# Patient Record
Sex: FEMALE | NOT HISPANIC OR LATINO | Employment: FULL TIME | URBAN - METROPOLITAN AREA
[De-identification: names, ages, dates, MRNs, and addresses within clinical notes are randomized per-mention and may not be internally consistent; named-entity substitution may affect disease eponyms.]

---

## 2024-03-14 ENCOUNTER — NUTRITION (OUTPATIENT)
Dept: OBSTETRICS AND GYNECOLOGY | Facility: CLINIC | Age: 29
End: 2024-03-14

## 2024-03-14 LAB
EXTERNAL CHLAMYDIA SCREEN: NEGATIVE
EXTERNAL CHLAMYDIA SCREEN: NEGATIVE
EXTERNAL GONORRHEA SCREEN: NEGATIVE
EXTERNAL GONORRHEA SCREEN: NEGATIVE
GLUCOSE, 1 HR SCREEN, PREG EXTERNAL: 64 MG/DL
GLUCOSE, 1 HR SCREEN, PREG EXTERNAL: 64 MG/DL
SYPHILIS TOTAL AB EXTERNAL: NON REACTIVE
SYPHILIS TOTAL AB EXTERNAL: NON REACTIVE

## 2024-07-02 DIAGNOSIS — O16.2 HYPERTENSION AFFECTING PREGNANCY IN SECOND TRIMESTER (HHS-HCC): ICD-10-CM

## 2024-07-02 RX ORDER — ACETAMINOPHEN 500 MG
TABLET ORAL
Qty: 1 EACH | Refills: 0 | Status: SHIPPED | OUTPATIENT
Start: 2024-07-02

## 2024-07-03 ENCOUNTER — ANESTHESIA (OUTPATIENT)
Dept: OBSTETRICS AND GYNECOLOGY | Facility: HOSPITAL | Age: 29
End: 2024-07-03
Payer: COMMERCIAL

## 2024-07-03 ENCOUNTER — ANESTHESIA EVENT (OUTPATIENT)
Dept: OBSTETRICS AND GYNECOLOGY | Facility: HOSPITAL | Age: 29
End: 2024-07-03
Payer: COMMERCIAL

## 2024-07-03 ENCOUNTER — HOSPITAL ENCOUNTER (INPATIENT)
Facility: HOSPITAL | Age: 29
End: 2024-07-03
Attending: OBSTETRICS & GYNECOLOGY | Admitting: OBSTETRICS & GYNECOLOGY
Payer: COMMERCIAL

## 2024-07-03 DIAGNOSIS — O14.93: Primary | ICD-10-CM

## 2024-07-03 DIAGNOSIS — O14.93 PREECLAMPSIA, THIRD TRIMESTER (HHS-HCC): ICD-10-CM

## 2024-07-03 PROBLEM — E10.9 DIABETES MELLITUS TYPE 1 (MULTI): Status: ACTIVE | Noted: 2024-07-03

## 2024-07-03 PROBLEM — K22.2 ESOPHAGEAL STRICTURE: Status: ACTIVE | Noted: 2024-07-03

## 2024-07-03 PROBLEM — G70.9 NEUROMUSCULAR DISEASE (MULTI): Status: ACTIVE | Noted: 2024-07-03

## 2024-07-03 PROBLEM — E78.5 HYPERLIPIDEMIA: Status: ACTIVE | Noted: 2024-07-03

## 2024-07-03 PROBLEM — E03.9 HYPOTHYROIDISM: Status: ACTIVE | Noted: 2024-07-03

## 2024-07-03 LAB
ABO GROUP (TYPE) IN BLOOD: NORMAL
ABO GROUP (TYPE) IN BLOOD: NORMAL
ALBUMIN SERPL BCP-MCNC: 2.7 G/DL (ref 3.4–5)
ALBUMIN SERPL BCP-MCNC: 2.7 G/DL (ref 3.4–5)
ALP SERPL-CCNC: 92 U/L (ref 33–110)
ALP SERPL-CCNC: 92 U/L (ref 33–110)
ALT SERPL W P-5'-P-CCNC: 8 U/L (ref 7–45)
ALT SERPL W P-5'-P-CCNC: 8 U/L (ref 7–45)
ANION GAP SERPL CALC-SCNC: 12 MMOL/L (ref 10–20)
ANION GAP SERPL CALC-SCNC: 12 MMOL/L (ref 10–20)
ANTIBODY SCREEN: NORMAL
ANTIBODY SCREEN: NORMAL
AST SERPL W P-5'-P-CCNC: 13 U/L (ref 9–39)
AST SERPL W P-5'-P-CCNC: 13 U/L (ref 9–39)
BILIRUB SERPL-MCNC: 0.3 MG/DL (ref 0–1.2)
BILIRUB SERPL-MCNC: 0.3 MG/DL (ref 0–1.2)
BUN SERPL-MCNC: 17 MG/DL (ref 6–23)
BUN SERPL-MCNC: 17 MG/DL (ref 6–23)
CALCIUM SERPL-MCNC: 8.1 MG/DL (ref 8.6–10.6)
CALCIUM SERPL-MCNC: 8.1 MG/DL (ref 8.6–10.6)
CHLORIDE SERPL-SCNC: 108 MMOL/L (ref 98–107)
CHLORIDE SERPL-SCNC: 108 MMOL/L (ref 98–107)
CO2 SERPL-SCNC: 21 MMOL/L (ref 21–32)
CO2 SERPL-SCNC: 21 MMOL/L (ref 21–32)
CREAT SERPL-MCNC: 0.54 MG/DL (ref 0.5–1.05)
CREAT SERPL-MCNC: 0.54 MG/DL (ref 0.5–1.05)
CREAT UR-MCNC: 440.5 MG/DL (ref 20–320)
CREAT UR-MCNC: 440.5 MG/DL (ref 20–320)
EGFRCR SERPLBLD CKD-EPI 2021: >90 ML/MIN/1.73M*2
EGFRCR SERPLBLD CKD-EPI 2021: >90 ML/MIN/1.73M*2
ERYTHROCYTE [DISTWIDTH] IN BLOOD BY AUTOMATED COUNT: 13.5 % (ref 11.5–14.5)
ERYTHROCYTE [DISTWIDTH] IN BLOOD BY AUTOMATED COUNT: 13.5 % (ref 11.5–14.5)
GLUCOSE BLD MANUAL STRIP-MCNC: 63 MG/DL (ref 74–99)
GLUCOSE BLD MANUAL STRIP-MCNC: 63 MG/DL (ref 74–99)
GLUCOSE BLD MANUAL STRIP-MCNC: 91 MG/DL (ref 74–99)
GLUCOSE BLD MANUAL STRIP-MCNC: 91 MG/DL (ref 74–99)
GLUCOSE BLD MANUAL STRIP-MCNC: 97 MG/DL (ref 74–99)
GLUCOSE BLD MANUAL STRIP-MCNC: 97 MG/DL (ref 74–99)
GLUCOSE SERPL-MCNC: 85 MG/DL (ref 74–99)
GLUCOSE SERPL-MCNC: 85 MG/DL (ref 74–99)
HCT VFR BLD AUTO: 35.2 % (ref 36–46)
HCT VFR BLD AUTO: 35.2 % (ref 36–46)
HGB BLD-MCNC: 11.9 G/DL (ref 12–16)
HGB BLD-MCNC: 11.9 G/DL (ref 12–16)
MCH RBC QN AUTO: 29.5 PG (ref 26–34)
MCH RBC QN AUTO: 29.5 PG (ref 26–34)
MCHC RBC AUTO-ENTMCNC: 33.8 G/DL (ref 32–36)
MCHC RBC AUTO-ENTMCNC: 33.8 G/DL (ref 32–36)
MCV RBC AUTO: 87 FL (ref 80–100)
MCV RBC AUTO: 87 FL (ref 80–100)
NRBC BLD-RTO: 0 /100 WBCS (ref 0–0)
NRBC BLD-RTO: 0 /100 WBCS (ref 0–0)
PLATELET # BLD AUTO: 163 X10*3/UL (ref 150–450)
PLATELET # BLD AUTO: 163 X10*3/UL (ref 150–450)
POTASSIUM SERPL-SCNC: 4 MMOL/L (ref 3.5–5.3)
POTASSIUM SERPL-SCNC: 4 MMOL/L (ref 3.5–5.3)
PROT SERPL-MCNC: 4.9 G/DL (ref 6.4–8.2)
PROT SERPL-MCNC: 4.9 G/DL (ref 6.4–8.2)
PROT UR-ACNC: 609 MG/DL (ref 5–24)
PROT UR-ACNC: 609 MG/DL (ref 5–24)
PROT/CREAT UR: 1.38 MG/MG CREAT (ref 0–0.17)
PROT/CREAT UR: 1.38 MG/MG CREAT (ref 0–0.17)
RBC # BLD AUTO: 4.03 X10*6/UL (ref 4–5.2)
RBC # BLD AUTO: 4.03 X10*6/UL (ref 4–5.2)
RH FACTOR (ANTIGEN D): NORMAL
RH FACTOR (ANTIGEN D): NORMAL
SODIUM SERPL-SCNC: 137 MMOL/L (ref 136–145)
SODIUM SERPL-SCNC: 137 MMOL/L (ref 136–145)
TREPONEMA PALLIDUM IGG+IGM AB [PRESENCE] IN SERUM OR PLASMA BY IMMUNOASSAY: NONREACTIVE
TREPONEMA PALLIDUM IGG+IGM AB [PRESENCE] IN SERUM OR PLASMA BY IMMUNOASSAY: NONREACTIVE
WBC # BLD AUTO: 6.1 X10*3/UL (ref 4.4–11.3)
WBC # BLD AUTO: 6.1 X10*3/UL (ref 4.4–11.3)

## 2024-07-03 PROCEDURE — 99199 UNLISTED SPECIAL SVC PX/RPRT: CPT

## 2024-07-03 PROCEDURE — 2500000002 HC RX 250 W HCPCS SELF ADMINISTERED DRUGS (ALT 637 FOR MEDICARE OP, ALT 636 FOR OP/ED)

## 2024-07-03 PROCEDURE — 1100000001 HC PRIVATE ROOM DAILY

## 2024-07-03 PROCEDURE — 36415 COLL VENOUS BLD VENIPUNCTURE: CPT

## 2024-07-03 PROCEDURE — 80053 COMPREHEN METABOLIC PANEL: CPT

## 2024-07-03 PROCEDURE — 2500000001 HC RX 250 WO HCPCS SELF ADMINISTERED DRUGS (ALT 637 FOR MEDICARE OP)

## 2024-07-03 PROCEDURE — 99223 1ST HOSP IP/OBS HIGH 75: CPT

## 2024-07-03 PROCEDURE — 2500000004 HC RX 250 GENERAL PHARMACY W/ HCPCS (ALT 636 FOR OP/ED)

## 2024-07-03 PROCEDURE — 3E0P7VZ INTRODUCTION OF HORMONE INTO FEMALE REPRODUCTIVE, VIA NATURAL OR ARTIFICIAL OPENING: ICD-10-PCS

## 2024-07-03 PROCEDURE — 82570 ASSAY OF URINE CREATININE: CPT

## 2024-07-03 PROCEDURE — 82947 ASSAY GLUCOSE BLOOD QUANT: CPT

## 2024-07-03 PROCEDURE — 99213 OFFICE O/P EST LOW 20 MIN: CPT

## 2024-07-03 PROCEDURE — 7210000002 HC LABOR PER HOUR

## 2024-07-03 PROCEDURE — 99221 1ST HOSP IP/OBS SF/LOW 40: CPT

## 2024-07-03 PROCEDURE — 85027 COMPLETE CBC AUTOMATED: CPT

## 2024-07-03 PROCEDURE — 86780 TREPONEMA PALLIDUM: CPT

## 2024-07-03 PROCEDURE — 86901 BLOOD TYPING SEROLOGIC RH(D): CPT

## 2024-07-03 RX ORDER — ONDANSETRON 4 MG/1
4 TABLET, FILM COATED ORAL EVERY 6 HOURS PRN
Status: DISCONTINUED | OUTPATIENT
Start: 2024-07-03 | End: 2024-07-03

## 2024-07-03 RX ORDER — DULOXETIN HYDROCHLORIDE 60 MG/1
60 CAPSULE, DELAYED RELEASE ORAL DAILY
Status: DISCONTINUED | OUTPATIENT
Start: 2024-07-04 | End: 2024-07-09 | Stop reason: HOSPADM

## 2024-07-03 RX ORDER — CARBOPROST TROMETHAMINE 250 UG/ML
250 INJECTION, SOLUTION INTRAMUSCULAR ONCE AS NEEDED
Status: DISCONTINUED | OUTPATIENT
Start: 2024-07-03 | End: 2024-07-06 | Stop reason: HOSPADM

## 2024-07-03 RX ORDER — LABETALOL HYDROCHLORIDE 5 MG/ML
20 INJECTION, SOLUTION INTRAVENOUS ONCE AS NEEDED
Status: COMPLETED | OUTPATIENT
Start: 2024-07-03 | End: 2024-07-03

## 2024-07-03 RX ORDER — NIFEDIPINE 30 MG/1
30 TABLET, FILM COATED, EXTENDED RELEASE ORAL ONCE
Status: COMPLETED | OUTPATIENT
Start: 2024-07-03 | End: 2024-07-03

## 2024-07-03 RX ORDER — METOCLOPRAMIDE HYDROCHLORIDE 5 MG/ML
10 INJECTION INTRAMUSCULAR; INTRAVENOUS ONCE
Status: DISCONTINUED | OUTPATIENT
Start: 2024-07-03 | End: 2024-07-04

## 2024-07-03 RX ORDER — DULOXETIN HYDROCHLORIDE 60 MG/1
60 CAPSULE, DELAYED RELEASE ORAL DAILY
COMMUNITY
Start: 2024-05-15

## 2024-07-03 RX ORDER — LEVOTHYROXINE SODIUM 50 UG/1
50 TABLET ORAL DAILY
Status: DISCONTINUED | OUTPATIENT
Start: 2024-07-04 | End: 2024-07-09 | Stop reason: HOSPADM

## 2024-07-03 RX ORDER — INSULIN LISPRO-AABC 100 [IU]/ML
INJECTION, SOLUTION INTRAVENOUS; SUBCUTANEOUS
COMMUNITY
Start: 2024-02-13

## 2024-07-03 RX ORDER — BISACODYL 10 MG/1
10 SUPPOSITORY RECTAL DAILY PRN
Status: DISCONTINUED | OUTPATIENT
Start: 2024-07-03 | End: 2024-07-06

## 2024-07-03 RX ORDER — NIFEDIPINE 60 MG/1
60 TABLET, FILM COATED, EXTENDED RELEASE ORAL
Status: DISCONTINUED | OUTPATIENT
Start: 2024-07-04 | End: 2024-07-04

## 2024-07-03 RX ORDER — HYDRALAZINE HYDROCHLORIDE 20 MG/ML
5 INJECTION INTRAMUSCULAR; INTRAVENOUS ONCE AS NEEDED
Status: COMPLETED | OUTPATIENT
Start: 2024-07-03 | End: 2024-07-04

## 2024-07-03 RX ORDER — ASPIRIN 81 MG/1
81 TABLET ORAL ONCE
COMMUNITY
Start: 2024-04-20 | End: 2024-07-09 | Stop reason: HOSPADM

## 2024-07-03 RX ORDER — PREGABALIN 75 MG/1
300 CAPSULE ORAL 2 TIMES DAILY
Status: DISCONTINUED | OUTPATIENT
Start: 2024-07-03 | End: 2024-07-09 | Stop reason: HOSPADM

## 2024-07-03 RX ORDER — DEXTROSE 40 %
30 GEL (GRAM) ORAL
Status: DISCONTINUED | OUTPATIENT
Start: 2024-07-03 | End: 2024-07-09 | Stop reason: HOSPADM

## 2024-07-03 RX ORDER — BLOOD-GLUCOSE TRANSMITTER
EACH MISCELLANEOUS
COMMUNITY
Start: 2024-04-19

## 2024-07-03 RX ORDER — HYDRALAZINE HYDROCHLORIDE 20 MG/ML
5 INJECTION INTRAMUSCULAR; INTRAVENOUS ONCE AS NEEDED
Status: DISCONTINUED | OUTPATIENT
Start: 2024-07-03 | End: 2024-07-03

## 2024-07-03 RX ORDER — ACETAMINOPHEN 325 MG/1
975 TABLET ORAL ONCE
Status: COMPLETED | OUTPATIENT
Start: 2024-07-03 | End: 2024-07-03

## 2024-07-03 RX ORDER — SODIUM CHLORIDE, SODIUM LACTATE, POTASSIUM CHLORIDE, CALCIUM CHLORIDE 600; 310; 30; 20 MG/100ML; MG/100ML; MG/100ML; MG/100ML
125 INJECTION, SOLUTION INTRAVENOUS CONTINUOUS
Status: DISCONTINUED | OUTPATIENT
Start: 2024-07-03 | End: 2024-07-03 | Stop reason: SDUPTHER

## 2024-07-03 RX ORDER — METHYLERGONOVINE MALEATE 0.2 MG/ML
0.2 INJECTION INTRAVENOUS ONCE AS NEEDED
Status: DISCONTINUED | OUTPATIENT
Start: 2024-07-03 | End: 2024-07-06 | Stop reason: HOSPADM

## 2024-07-03 RX ORDER — ONDANSETRON HYDROCHLORIDE 2 MG/ML
4 INJECTION, SOLUTION INTRAVENOUS EVERY 6 HOURS PRN
Status: DISCONTINUED | OUTPATIENT
Start: 2024-07-03 | End: 2024-07-06

## 2024-07-03 RX ORDER — LABETALOL HYDROCHLORIDE 5 MG/ML
20 INJECTION, SOLUTION INTRAVENOUS ONCE AS NEEDED
Status: DISCONTINUED | OUTPATIENT
Start: 2024-07-03 | End: 2024-07-03

## 2024-07-03 RX ORDER — NIFEDIPINE 10 MG/1
10 CAPSULE ORAL ONCE AS NEEDED
Status: DISCONTINUED | OUTPATIENT
Start: 2024-07-03 | End: 2024-07-06 | Stop reason: HOSPADM

## 2024-07-03 RX ORDER — DEXTROSE 50 % IN WATER (D50W) INTRAVENOUS SYRINGE
25
Status: DISCONTINUED | OUTPATIENT
Start: 2024-07-03 | End: 2024-07-09 | Stop reason: HOSPADM

## 2024-07-03 RX ORDER — SIMETHICONE 80 MG
80 TABLET,CHEWABLE ORAL 4 TIMES DAILY PRN
Status: DISCONTINUED | OUTPATIENT
Start: 2024-07-03 | End: 2024-07-06

## 2024-07-03 RX ORDER — MAGNESIUM SULFATE HEPTAHYDRATE 40 MG/ML
2 INJECTION, SOLUTION INTRAVENOUS CONTINUOUS
Status: DISCONTINUED | OUTPATIENT
Start: 2024-07-03 | End: 2024-07-09 | Stop reason: HOSPADM

## 2024-07-03 RX ORDER — METOPROLOL TARTRATE 25 MG/1
25 TABLET, FILM COATED ORAL 2 TIMES DAILY
Status: DISCONTINUED | OUTPATIENT
Start: 2024-07-03 | End: 2024-07-09 | Stop reason: HOSPADM

## 2024-07-03 RX ORDER — LIDOCAINE HYDROCHLORIDE 10 MG/ML
30 INJECTION INFILTRATION; PERINEURAL ONCE AS NEEDED
Status: DISCONTINUED | OUTPATIENT
Start: 2024-07-03 | End: 2024-07-06 | Stop reason: HOSPADM

## 2024-07-03 RX ORDER — SODIUM CHLORIDE, SODIUM LACTATE, POTASSIUM CHLORIDE, CALCIUM CHLORIDE 600; 310; 30; 20 MG/100ML; MG/100ML; MG/100ML; MG/100ML
75 INJECTION, SOLUTION INTRAVENOUS CONTINUOUS
Status: DISCONTINUED | OUTPATIENT
Start: 2024-07-03 | End: 2024-07-09 | Stop reason: HOSPADM

## 2024-07-03 RX ORDER — PREGABALIN 300 MG/1
300 CAPSULE ORAL 2 TIMES DAILY
COMMUNITY
Start: 2024-06-14

## 2024-07-03 RX ORDER — LABETALOL HYDROCHLORIDE 5 MG/ML
20 INJECTION, SOLUTION INTRAVENOUS ONCE AS NEEDED
Status: COMPLETED | OUTPATIENT
Start: 2024-07-03 | End: 2024-07-05

## 2024-07-03 RX ORDER — METOCLOPRAMIDE 10 MG/1
10 TABLET ORAL EVERY 6 HOURS PRN
Status: DISCONTINUED | OUTPATIENT
Start: 2024-07-03 | End: 2024-07-06

## 2024-07-03 RX ORDER — NIFEDIPINE 10 MG/1
10 CAPSULE ORAL ONCE AS NEEDED
Status: DISCONTINUED | OUTPATIENT
Start: 2024-07-03 | End: 2024-07-03

## 2024-07-03 RX ORDER — INSULIN PMP CART,AUT,G6/7,CNTR
EACH SUBCUTANEOUS
COMMUNITY
Start: 2023-07-10

## 2024-07-03 RX ORDER — TERBUTALINE SULFATE 1 MG/ML
0.25 INJECTION SUBCUTANEOUS ONCE AS NEEDED
Status: DISCONTINUED | OUTPATIENT
Start: 2024-07-03 | End: 2024-07-06 | Stop reason: HOSPADM

## 2024-07-03 RX ORDER — CALCIUM GLUCONATE 98 MG/ML
1 INJECTION, SOLUTION INTRAVENOUS ONCE AS NEEDED
Status: DISCONTINUED | OUTPATIENT
Start: 2024-07-03 | End: 2024-07-09 | Stop reason: HOSPADM

## 2024-07-03 RX ORDER — ONDANSETRON 4 MG/1
4 TABLET, FILM COATED ORAL EVERY 6 HOURS PRN
Status: DISCONTINUED | OUTPATIENT
Start: 2024-07-03 | End: 2024-07-04

## 2024-07-03 RX ORDER — DIPHENHYDRAMINE HYDROCHLORIDE 50 MG/ML
25 INJECTION INTRAMUSCULAR; INTRAVENOUS ONCE
Status: COMPLETED | OUTPATIENT
Start: 2024-07-03 | End: 2024-07-03

## 2024-07-03 RX ORDER — METOCLOPRAMIDE HYDROCHLORIDE 5 MG/ML
10 INJECTION INTRAMUSCULAR; INTRAVENOUS EVERY 6 HOURS PRN
Status: DISCONTINUED | OUTPATIENT
Start: 2024-07-03 | End: 2024-07-06

## 2024-07-03 RX ORDER — LEVOTHYROXINE SODIUM 50 UG/1
50 TABLET ORAL DAILY
COMMUNITY
Start: 2024-04-19

## 2024-07-03 RX ORDER — LABETALOL HYDROCHLORIDE 5 MG/ML
INJECTION, SOLUTION INTRAVENOUS
Status: DISPENSED
Start: 2024-07-03 | End: 2024-07-04

## 2024-07-03 RX ORDER — OXYTOCIN 10 [USP'U]/ML
10 INJECTION, SOLUTION INTRAMUSCULAR; INTRAVENOUS ONCE AS NEEDED
Status: DISCONTINUED | OUTPATIENT
Start: 2024-07-03 | End: 2024-07-06 | Stop reason: HOSPADM

## 2024-07-03 RX ORDER — OXYTOCIN/0.9 % SODIUM CHLORIDE 30/500 ML
60 PLASTIC BAG, INJECTION (ML) INTRAVENOUS ONCE AS NEEDED
Status: COMPLETED | OUTPATIENT
Start: 2024-07-03 | End: 2024-07-05

## 2024-07-03 RX ORDER — TRANEXAMIC ACID 100 MG/ML
1000 INJECTION, SOLUTION INTRAVENOUS ONCE AS NEEDED
Status: DISCONTINUED | OUTPATIENT
Start: 2024-07-03 | End: 2024-07-06 | Stop reason: HOSPADM

## 2024-07-03 RX ORDER — METOPROLOL TARTRATE 25 MG/1
25 TABLET, FILM COATED ORAL 2 TIMES DAILY
COMMUNITY
Start: 2024-04-23

## 2024-07-03 RX ORDER — MISOPROSTOL 200 UG/1
800 TABLET ORAL ONCE AS NEEDED
Status: DISCONTINUED | OUTPATIENT
Start: 2024-07-03 | End: 2024-07-06 | Stop reason: HOSPADM

## 2024-07-03 RX ORDER — ONDANSETRON HYDROCHLORIDE 2 MG/ML
4 INJECTION, SOLUTION INTRAVENOUS EVERY 6 HOURS PRN
Status: DISCONTINUED | OUTPATIENT
Start: 2024-07-03 | End: 2024-07-03

## 2024-07-03 RX ORDER — DEXTROSE MONOHYDRATE 100 MG/ML
100 INJECTION, SOLUTION INTRAVENOUS AS NEEDED
Status: DISCONTINUED | OUTPATIENT
Start: 2024-07-03 | End: 2024-07-05

## 2024-07-03 RX ORDER — LOPERAMIDE HYDROCHLORIDE 2 MG/1
4 CAPSULE ORAL EVERY 2 HOUR PRN
Status: DISCONTINUED | OUTPATIENT
Start: 2024-07-03 | End: 2024-07-06 | Stop reason: HOSPADM

## 2024-07-03 RX ORDER — NIFEDIPINE 10 MG/1
10 CAPSULE ORAL ONCE AS NEEDED
Status: DISCONTINUED | OUTPATIENT
Start: 2024-07-03 | End: 2024-07-03 | Stop reason: SDUPTHER

## 2024-07-03 RX ORDER — SODIUM CHLORIDE 9 MG/ML
25 INJECTION, SOLUTION INTRAVENOUS CONTINUOUS PRN
Status: DISCONTINUED | OUTPATIENT
Start: 2024-07-03 | End: 2024-07-05

## 2024-07-03 RX ORDER — ADHESIVE BANDAGE
10 BANDAGE TOPICAL
Status: DISCONTINUED | OUTPATIENT
Start: 2024-07-03 | End: 2024-07-06

## 2024-07-03 RX ORDER — DEXTROSE 40 %
15 GEL (GRAM) ORAL
Status: DISCONTINUED | OUTPATIENT
Start: 2024-07-03 | End: 2024-07-09 | Stop reason: HOSPADM

## 2024-07-03 RX ORDER — LIDOCAINE HYDROCHLORIDE 10 MG/ML
0.5 INJECTION INFILTRATION; PERINEURAL ONCE AS NEEDED
Status: DISCONTINUED | OUTPATIENT
Start: 2024-07-03 | End: 2024-07-06

## 2024-07-03 RX ORDER — LIDOCAINE HYDROCHLORIDE 10 MG/ML
0.5 INJECTION INFILTRATION; PERINEURAL ONCE AS NEEDED
Status: DISCONTINUED | OUTPATIENT
Start: 2024-07-03 | End: 2024-07-03

## 2024-07-03 RX ORDER — DEXTROSE MONOHYDRATE 100 MG/ML
50 INJECTION, SOLUTION INTRAVENOUS CONTINUOUS PRN
Status: DISCONTINUED | OUTPATIENT
Start: 2024-07-03 | End: 2024-07-05

## 2024-07-03 RX ORDER — POLYETHYLENE GLYCOL 3350 17 G/17G
17 POWDER, FOR SOLUTION ORAL 2 TIMES DAILY PRN
Status: DISCONTINUED | OUTPATIENT
Start: 2024-07-03 | End: 2024-07-06

## 2024-07-03 SDOH — SOCIAL STABILITY: SOCIAL INSECURITY: VERBAL ABUSE: DENIES

## 2024-07-03 SDOH — SOCIAL STABILITY: SOCIAL INSECURITY: HAS ANYONE EVER THREATENED TO HURT YOUR FAMILY OR YOUR PETS?: NO

## 2024-07-03 SDOH — HEALTH STABILITY: MENTAL HEALTH: SUICIDAL BEHAVIOR (LIFETIME): NO

## 2024-07-03 SDOH — SOCIAL STABILITY: SOCIAL INSECURITY: ABUSE SCREEN: ADULT

## 2024-07-03 SDOH — SOCIAL STABILITY: SOCIAL INSECURITY: DOES ANYONE TRY TO KEEP YOU FROM HAVING/CONTACTING OTHER FRIENDS OR DOING THINGS OUTSIDE YOUR HOME?: NO

## 2024-07-03 SDOH — ECONOMIC STABILITY: HOUSING INSECURITY: DO YOU FEEL UNSAFE GOING BACK TO THE PLACE WHERE YOU ARE LIVING?: NO

## 2024-07-03 SDOH — HEALTH STABILITY: MENTAL HEALTH: WERE YOU ABLE TO COMPLETE ALL THE BEHAVIORAL HEALTH SCREENINGS?: YES

## 2024-07-03 SDOH — HEALTH STABILITY: MENTAL HEALTH: HAVE YOU USED ANY PRESCRIPTION DRUGS OTHER THAN PRESCRIBED IN THE PAST 12 MONTHS?: NO

## 2024-07-03 SDOH — SOCIAL STABILITY: SOCIAL INSECURITY: HAVE YOU HAD THOUGHTS OF HARMING ANYONE ELSE?: NO

## 2024-07-03 SDOH — HEALTH STABILITY: MENTAL HEALTH: STRENGTHS (MUST CHOOSE TWO): SUPPORT FROM FRIENDS;STABLE HOUSING;SUPPORT FROM FAMILY

## 2024-07-03 SDOH — HEALTH STABILITY: MENTAL HEALTH: HAVE YOU USED ANY SUBSTANCES (CANABIS, COCAINE, HEROIN, HALLUCINOGENS, INHALANTS, ETC.) IN THE PAST 12 MONTHS?: NO

## 2024-07-03 SDOH — SOCIAL STABILITY: SOCIAL INSECURITY: ARE THERE ANY APPARENT SIGNS OF INJURIES/BEHAVIORS THAT COULD BE RELATED TO ABUSE/NEGLECT?: NO

## 2024-07-03 SDOH — SOCIAL STABILITY: SOCIAL INSECURITY: HAVE YOU HAD ANY THOUGHTS OF HARMING ANYONE ELSE?: NO

## 2024-07-03 SDOH — HEALTH STABILITY: MENTAL HEALTH: NON-SPECIFIC ACTIVE SUICIDAL THOUGHTS (PAST 1 MONTH): NO

## 2024-07-03 SDOH — SOCIAL STABILITY: SOCIAL INSECURITY: DO YOU FEEL ANYONE HAS EXPLOITED OR TAKEN ADVANTAGE OF YOU FINANCIALLY OR OF YOUR PERSONAL PROPERTY?: NO

## 2024-07-03 SDOH — SOCIAL STABILITY: SOCIAL INSECURITY: ARE YOU OR HAVE YOU BEEN THREATENED OR ABUSED PHYSICALLY, EMOTIONALLY, OR SEXUALLY BY ANYONE?: NO

## 2024-07-03 SDOH — HEALTH STABILITY: MENTAL HEALTH: CURRENT SMOKER: 0

## 2024-07-03 SDOH — SOCIAL STABILITY: SOCIAL INSECURITY: PHYSICAL ABUSE: DENIES

## 2024-07-03 SDOH — HEALTH STABILITY: MENTAL HEALTH: WISH TO BE DEAD (PAST 1 MONTH): NO

## 2024-07-03 ASSESSMENT — PAIN SCALES - GENERAL
PAINLEVEL_OUTOF10: 3
PAINLEVEL_OUTOF10: 0 - NO PAIN
PAINLEVEL_OUTOF10: 2
PAINLEVEL_OUTOF10: 0 - NO PAIN
PAINLEVEL_OUTOF10: 1
PAINLEVEL_OUTOF10: 2
PAINLEVEL_OUTOF10: 0 - NO PAIN

## 2024-07-03 ASSESSMENT — PAIN DESCRIPTION - DESCRIPTORS: DESCRIPTORS: ACHING

## 2024-07-03 ASSESSMENT — PAIN DESCRIPTION - LOCATION
LOCATION: HEAD
LOCATION: HEAD

## 2024-07-03 ASSESSMENT — LIFESTYLE VARIABLES
HOW MANY STANDARD DRINKS CONTAINING ALCOHOL DO YOU HAVE ON A TYPICAL DAY: PATIENT DOES NOT DRINK
HOW OFTEN DO YOU HAVE 6 OR MORE DRINKS ON ONE OCCASION: NEVER
AUDIT-C TOTAL SCORE: 0
SKIP TO QUESTIONS 9-10: 1
HOW OFTEN DO YOU HAVE A DRINK CONTAINING ALCOHOL: NEVER
AUDIT-C TOTAL SCORE: 0

## 2024-07-03 ASSESSMENT — PATIENT HEALTH QUESTIONNAIRE - PHQ9
1. LITTLE INTEREST OR PLEASURE IN DOING THINGS: NOT AT ALL
SUM OF ALL RESPONSES TO PHQ9 QUESTIONS 1 & 2: 0
2. FEELING DOWN, DEPRESSED OR HOPELESS: NOT AT ALL

## 2024-07-03 NOTE — ANESTHESIA PREPROCEDURE EVALUATION
Patient: Estefani Alvarenga    Evaluation Method: In-person visit    Procedure Information    Date: 07/03/24  Procedure: Labor Consult         Relevant Problems   Anesthesia (within normal limits)      Cardiac   (+) Hyperlipidemia (Stopped statin once pregnant)   (+) Preeclampsia, third trimester ([facility]HS-HCC)      Pulmonary (within normal limits)      Neuro   (+) Neuromuscular disease (Multi) (Guillan-[place] (2020), residual atrophy both lower extremities, foot drop left foot. Uses cane/walker in unfamiliar territories.)      /Renal (within normal limits)      Liver (within normal limits)      Endocrine   (+) Diabetes mellitus type 1 (Multi) (S/p DKA (2020))   (+) Hypothyroidism (S/p 2020, on synthroid)      Hematology (within normal limits)  On daily ASA 81mg      Musculoskeletal (within normal limits)       Clinical information reviewed:    Allergies  Meds               NPO Detail:  NPO/Void Status  Date of Last Solid: 07/03/24  Time of Last Solid: 1730         OB/Gyn Evaluation    Present Pregnancy    Patient is pregnant now.  (+) , hypertensive disorder of pregnancy - superimposed preeclampsia with severe features   Obstetric History                Physical Exam    Airway  Mallampati: II  TM distance: <3 FB  Neck ROM: full     Cardiovascular   Rhythm: regular  Rate: normal     Dental    Pulmonary   Breath sounds clear to auscultation     Abdominal            Anesthesia Plan    History of general anesthesia?: yes  History of complications of general anesthesia?: no    ASA 2     general     The patient is not a current smoker.    intravenous induction   Postoperative administration of opioids is intended.  Trial extubation is planned.  Anesthetic plan and risks discussed with patient.  Use of blood products discussed with patient who consented to blood products.    Plan discussed with attending.

## 2024-07-03 NOTE — CONSULTS
MFM Consult    Reason for Consult: Newly diagnosed siPEC w/o SF    HPI:   30yo G1 @ 35.0wga by 11wk US who presented to triage for cramping and blood-tinged mucus discharge.     Reports she first noticed the cramping overnight and saw the mucus/blood discharge this morning. Small amount and no other VB. Thought maybe she was having some LOF. On arrival to OB triage for evaluation of these complaints, found to have mild range BP. Also reported 2/10 headache that resolved with tylenol. SVE c/l/h and speculum exam neg for pooling/nitrazine/ferning and no blood visualized. Cramping improved slightly since presentation. Pt denies SOB, CP, RUQ pain, vision changes.    Pregnancy complicated by:  - T1DM: Diagnosed in 2020 at time of DKA and other complications (below). Currently on CGM and omnipod in automode with following settings: 12:00 AM (6 hr) 9 g/Unit; 6:00 AM (11 hr) 6 g/Unit; 5:00 PM (7 hr) 8 g/Unit. Over last 1-2 weeks, postprandials usually <140.  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 Pt thought to have developed viral infection that caused pancreatic dysfunction and acquired T1DM. Pt presented in DKA and quickly decompensated, required ICU admission and ECMO. Pt also underwent XL via midline vertical incision for c/f bowel perf at the time, found to instead have compartment syndrome. Residual lower extremity paralysis following this acute episode requiring intensive rehab and pt is now ambulatory with cane. Has polyradiculopathy.   - cHTN, no medications  - Hypothyroid, on synthroid    Had echo in 4/24 with normal EF 55-60%. Started on metop 25 BID in spring for cardioprotection. Otherwise has not required ongoing cardiac follow up in last 1-2 years following the cardiomyopathy.    PMH: as above, asthma, depression  PSH: midline vertical XL as above  Meds: metoprolol 25 BID, omnipod as above, pregabalin, synthroid 50mcg daily, duloxetine 60mg daily,  bASA  Social hx: no t/a/d      Obstetrical History   OB History          1    Para        Term                AB        Living             SAB        IAB        Ectopic        Multiple        Live Births                     Allergies  No Known Allergies    Medications  Medications Prior to Admission   Medication Sig Dispense Refill Last Dose    aspirin 81 mg EC tablet Take 1 tablet (81 mg) by mouth 1 time.       Dexcom G6 Transmitter device        DULoxetine (Cymbalta) 60 mg DR capsule Take 1 capsule (60 mg) by mouth once daily.       levothyroxine (Synthroid, Levoxyl) 50 mcg tablet Take 1 tablet (50 mcg) by mouth early in the morning..       Lyumjev U-100 Insulin 100 unit/mL solution        metoprolol tartrate (Lopressor) 25 mg tablet Take 1 tablet (25 mg) by mouth 2 times a day.       Omnipod 5 G6 Intro Kit, Gen 5, cartridge        pregabalin (Lyrica) 300 mg capsule Take 1 capsule (300 mg) by mouth 2 times a day.          OBJECTIVE:   BP (!) 159/94   Pulse 70   Temp 36.6 °C (97.9 °F) (Temporal)   Resp 18   SpO2 97%    Temp  Min: 36.6 °C (97.9 °F)  Max: 36.6 °C (97.9 °F)  Pulse  Min: 70  Max: 78  BP  Min: 143/90  Max: 159/94    Physical exam:  Constitutional: No visible distress, alert and cooperative  Respiratory/Thorax: Normal respiratory effort on RA  Cardiovascular: Reg rate  Gastrointestinal: soft, nondistended, nontender, gravid  Neurological: A&Ox3  Psychological: Appropriate mood and behavior    NST: Baseline 128, accelerations present, no decelerations, mod variability   [place]: quiet     Labs:   Lab Results   Component Value Date    WBC 6.1 2024    HGB 11.9 (L) 2024    HCT 35.2 (L) 2024     2024     Lab Results   Component Value Date    GLUCOSE 85 2024     2024    K 4.0 2024     (H) 2024    CO2 21 2024    ANIONGAP 12 2024    BUN 17 2024    CREATININE 0.54 2024    EGFR >90 2024    CALCIUM 8.1  (L) 07/03/2024    ALBUMIN 2.7 (L) 07/03/2024    PROT 4.9 (L) 07/03/2024    ALKPHOS 92 07/03/2024    ALT 8 07/03/2024    AST 13 07/03/2024    BILITOT 0.3 07/03/2024     0   Lab Value Date/Time    UTPCR 1.38 (H) 07/03/2024 1352       ASSESSMENT AND PLAN:     30yo G1 @ 35.0wga by 11wk US found to have new siPEC without SF in triage today.    siPEC w/o SF  - cHTN on chart review, not on meds. Had been normotensive in pregnancy until today. Superimposed PEC without SF diagnosed by P:C 1.38, mild range BP.  - CBC, CMP wnl  - Asymptomatic  - Recommend inpatient admission for BP obs and discussed delivery indicated if severe features develop.    T1DM  - Cont CGM and omnipod on automode with following settings:   12:00 AM (6 hr) 9 g/Unit;   6:00 AM (11 hr) 6 g/Unit;   5:00 PM (7 hr) 8 g/Unit.  - BG 99 on presentation in triage    Hx cardiomyopathy  - Cont metop 25 BID  - Echo 4/2024 wnl    Guillian-[place] syndrome, polyradiculopathy  - Cont pregabalin    Fetal status:   - NST reactive  - Last Growth:  7/2: EFW 2810 g 77%, AC 91%      Dispo: Inpatient obs for BP monitoring    Pt seen and discussed with MFM Attending, Dr. Arora.   Ana Lilia Lopez MD PGY3  [facility] pager [phone]     Active Problems:  There are no active Hospital Problems.

## 2024-07-03 NOTE — H&P
Obstetrical Admission History and Physical    Assessment/Plan    Estefani Alvarenga is a 29 y.o.  at 35w0d by 11w6d US initially presenting for spotting, intermittent contractions, vaginal discharge is admitted for severe preeclampsia  She reports good fetal movement, 2/10 headache which resolved with treatment, denies visual disturbances, RUQ pain, chest pain, shortness of breath.    IOL  admitted, consented, scanned - cephalic  cervix unfavorable - for ripening with CRB and cytotec  for pitocin and AROM when appropriate   discussed risks and benefits of vaginal delivery including bleeding, infection, injury to surrounding structures. Pt expressed understanding, strongly desires vaginal delivery.      Severe PEC  BP: 155/100  sPEC diagnosed by severe range Bps requiring IV treatment with Labetalol , P:C 1.38  HA 2/10-->Tylenol, patient reports resolution of headache after treatment, otherwise asx  On Metoprolol 25 (started by Cardiology for hx of cardiomyopathy)  HELLP labs negx1, P:C 1.38  Mag on admission  Nifedipine 30 mg    T1DM  POC Glc 91  Has CGM, pump, Last HbA1C 6.4 ()  Last growth US : EFW 2810 g 77%, AC 91%  Scheduled for IOL at 37 wk    R/o Labor/PROM  SVE: closed/ long  SSE negx3  [place] no contractions, low concern for labor    Hx of Cardiomyopathy  Dx in the setting of DKA c/b cardiogenic shock requiring ICU admission in   S/p Cardiology NPV with Dr. Young/Echo . Echo - normal, EF 55-60% ()  BNP wnl     Hx of Guillian- [place] (), Polyradiculopathy  Associated with muscle weakness and pain. Follows with pain management  On pregabalin 300  Left foot drop   S/p Neurology NPV with Dr. Tamara Zhou, established pain management    Hypothyroidism  On 50 mcg Synthroid  TSH wnl     Hx of Ex-Lap  Median incision from sternum to pubis 2020 for abdominal distension, c/f bleeding  Findings: 1500 ml ascites, primary closure    IUP at 35 w  Up to date on prenatal care  GBS neg    PPBC: POPs as bridge to NuvaRing     Fetal wellbeing  NST Reactive/AGA  Good fetal movement    Seen and evaluated with Dr. Kar Alejandra MD PGY2  OBGYN    Subjective   Patient reports spotting, intermittent contractions, vaginal discharge.  She reports good fetal movement, 2/10 headache, denies visual disturbances, RUQ pain, chest pain, shortness of breath.    Pregnancy notable for:  Medical Problems        Obstetrical History   OB History    Para Term  AB Living   1             SAB IAB Ectopic Multiple Live Births                  # Outcome Date GA Lbr Bipin/2nd Weight Sex Delivery Anes PTL Lv   1 Current                Past Medical History  Hx of Guillian [place]   Hx of Cardiomyopathy  T1DM  Hypothyroidism    Past Surgical History   Exploratory Laparotomy  median- paramedian incision from sternum to pubis     Social History  Social History     Tobacco Use    Smoking status: Not on file    Smokeless tobacco: Not on file   Substance Use Topics    Alcohol use: Not on file     Substance and Sexual Activity   Drug Use Not on file       Allergies  Patient has no known allergies.     Medications  Medications Prior to Admission   Medication Sig Dispense Refill Last Dose    aspirin 81 mg EC tablet Take 1 tablet (81 mg) by mouth 1 time.       Dexcom G6 Transmitter device        DULoxetine (Cymbalta) 60 mg DR capsule Take 1 capsule (60 mg) by mouth once daily.       levothyroxine (Synthroid, Levoxyl) 50 mcg tablet Take 1 tablet (50 mcg) by mouth early in the morning..       Lyumjev U-100 Insulin 100 unit/mL solution        metoprolol tartrate (Lopressor) 25 mg tablet Take 1 tablet (25 mg) by mouth 2 times a day.       Omnipod 5 G6 Intro Kit, Gen 5, cartridge        pregabalin (Lyrica) 300 mg capsule Take 1 capsule (300 mg) by mouth 2 times a day.          Objective    Last Vitals  Temp Pulse Resp BP MAP O2 Sat   36.6 °C (97.9 °F) 70 18 (!) 155/100   97 %     Physical Examination  General: no acute  distress  HEENT: normocephalic, atraumatic  Heart: warm and well perfused  Lungs: breathing comfortably on room air  Abdomen: gravid, ex-lap scar median from sternum to pubis  SSE: negx3, visually closed, no blood observed  SVE: closed/long/high  FHT: Baseline 130/ moderate variability/+accelerations/ -decelerations    [place]: no contractions    Fetal Assessment  Movement: Present  Mode: External US  Baseline Fetal Heart Rate (bpm): 135 bpm  Baseline Classification: Normal  Variability: Moderate (Between 6 and 25 BPM)  Pattern: Accelerations

## 2024-07-04 ENCOUNTER — ANESTHESIA (OUTPATIENT)
Dept: OBSTETRICS AND GYNECOLOGY | Facility: HOSPITAL | Age: 29
End: 2024-07-04
Payer: COMMERCIAL

## 2024-07-04 ENCOUNTER — ANESTHESIA EVENT (OUTPATIENT)
Dept: OBSTETRICS AND GYNECOLOGY | Facility: HOSPITAL | Age: 29
End: 2024-07-04
Payer: COMMERCIAL

## 2024-07-04 LAB
ALBUMIN SERPL BCP-MCNC: 2.9 G/DL (ref 3.4–5)
ALBUMIN SERPL BCP-MCNC: 2.9 G/DL (ref 3.4–5)
ALP SERPL-CCNC: 99 U/L (ref 33–110)
ALP SERPL-CCNC: 99 U/L (ref 33–110)
ALT SERPL W P-5'-P-CCNC: 8 U/L (ref 7–45)
ALT SERPL W P-5'-P-CCNC: 8 U/L (ref 7–45)
ANION GAP SERPL CALC-SCNC: 15 MMOL/L (ref 10–20)
ANION GAP SERPL CALC-SCNC: 15 MMOL/L (ref 10–20)
AST SERPL W P-5'-P-CCNC: 14 U/L (ref 9–39)
AST SERPL W P-5'-P-CCNC: 14 U/L (ref 9–39)
BILIRUB SERPL-MCNC: 0.3 MG/DL (ref 0–1.2)
BILIRUB SERPL-MCNC: 0.3 MG/DL (ref 0–1.2)
BUN SERPL-MCNC: 15 MG/DL (ref 6–23)
BUN SERPL-MCNC: 15 MG/DL (ref 6–23)
CALCIUM SERPL-MCNC: 8.5 MG/DL (ref 8.6–10.6)
CALCIUM SERPL-MCNC: 8.5 MG/DL (ref 8.6–10.6)
CHLORIDE SERPL-SCNC: 105 MMOL/L (ref 98–107)
CHLORIDE SERPL-SCNC: 105 MMOL/L (ref 98–107)
CO2 SERPL-SCNC: 20 MMOL/L (ref 21–32)
CO2 SERPL-SCNC: 20 MMOL/L (ref 21–32)
CREAT SERPL-MCNC: 0.68 MG/DL (ref 0.5–1.05)
CREAT SERPL-MCNC: 0.68 MG/DL (ref 0.5–1.05)
EGFRCR SERPLBLD CKD-EPI 2021: >90 ML/MIN/1.73M*2
EGFRCR SERPLBLD CKD-EPI 2021: >90 ML/MIN/1.73M*2
ERYTHROCYTE [DISTWIDTH] IN BLOOD BY AUTOMATED COUNT: 13.8 % (ref 11.5–14.5)
ERYTHROCYTE [DISTWIDTH] IN BLOOD BY AUTOMATED COUNT: 13.8 % (ref 11.5–14.5)
GLUCOSE BLD MANUAL STRIP-MCNC: 100 MG/DL (ref 74–99)
GLUCOSE BLD MANUAL STRIP-MCNC: 106 MG/DL (ref 74–99)
GLUCOSE BLD MANUAL STRIP-MCNC: 106 MG/DL (ref 74–99)
GLUCOSE BLD MANUAL STRIP-MCNC: 108 MG/DL (ref 74–99)
GLUCOSE BLD MANUAL STRIP-MCNC: 108 MG/DL (ref 74–99)
GLUCOSE BLD MANUAL STRIP-MCNC: 111 MG/DL (ref 74–99)
GLUCOSE BLD MANUAL STRIP-MCNC: 111 MG/DL (ref 74–99)
GLUCOSE BLD MANUAL STRIP-MCNC: 114 MG/DL (ref 74–99)
GLUCOSE BLD MANUAL STRIP-MCNC: 114 MG/DL (ref 74–99)
GLUCOSE BLD MANUAL STRIP-MCNC: 117 MG/DL (ref 74–99)
GLUCOSE BLD MANUAL STRIP-MCNC: 118 MG/DL (ref 74–99)
GLUCOSE BLD MANUAL STRIP-MCNC: 118 MG/DL (ref 74–99)
GLUCOSE BLD MANUAL STRIP-MCNC: 131 MG/DL (ref 74–99)
GLUCOSE BLD MANUAL STRIP-MCNC: 131 MG/DL (ref 74–99)
GLUCOSE BLD MANUAL STRIP-MCNC: 135 MG/DL (ref 74–99)
GLUCOSE BLD MANUAL STRIP-MCNC: 135 MG/DL (ref 74–99)
GLUCOSE BLD MANUAL STRIP-MCNC: 151 MG/DL (ref 74–99)
GLUCOSE BLD MANUAL STRIP-MCNC: 151 MG/DL (ref 74–99)
GLUCOSE BLD MANUAL STRIP-MCNC: 79 MG/DL (ref 74–99)
GLUCOSE BLD MANUAL STRIP-MCNC: 79 MG/DL (ref 74–99)
GLUCOSE BLD MANUAL STRIP-MCNC: 87 MG/DL (ref 74–99)
GLUCOSE BLD MANUAL STRIP-MCNC: 87 MG/DL (ref 74–99)
GLUCOSE BLD MANUAL STRIP-MCNC: 90 MG/DL (ref 74–99)
GLUCOSE BLD MANUAL STRIP-MCNC: 90 MG/DL (ref 74–99)
GLUCOSE BLD MANUAL STRIP-MCNC: 91 MG/DL (ref 74–99)
GLUCOSE BLD MANUAL STRIP-MCNC: 91 MG/DL (ref 74–99)
GLUCOSE BLD MANUAL STRIP-MCNC: 92 MG/DL (ref 74–99)
GLUCOSE BLD MANUAL STRIP-MCNC: 93 MG/DL (ref 74–99)
GLUCOSE BLD MANUAL STRIP-MCNC: 93 MG/DL (ref 74–99)
GLUCOSE BLD MANUAL STRIP-MCNC: 96 MG/DL (ref 74–99)
GLUCOSE BLD MANUAL STRIP-MCNC: 96 MG/DL (ref 74–99)
GLUCOSE BLD MANUAL STRIP-MCNC: 97 MG/DL (ref 74–99)
GLUCOSE BLD MANUAL STRIP-MCNC: 97 MG/DL (ref 74–99)
GLUCOSE BLD MANUAL STRIP-MCNC: 98 MG/DL (ref 74–99)
GLUCOSE BLD MANUAL STRIP-MCNC: 98 MG/DL (ref 74–99)
GLUCOSE BLD MANUAL STRIP-MCNC: 99 MG/DL (ref 74–99)
GLUCOSE SERPL-MCNC: 145 MG/DL (ref 74–99)
GLUCOSE SERPL-MCNC: 145 MG/DL (ref 74–99)
HCT VFR BLD AUTO: 37.8 % (ref 36–46)
HCT VFR BLD AUTO: 37.8 % (ref 36–46)
HGB BLD-MCNC: 12.5 G/DL (ref 12–16)
HGB BLD-MCNC: 12.5 G/DL (ref 12–16)
MCH RBC QN AUTO: 29.6 PG (ref 26–34)
MCH RBC QN AUTO: 29.6 PG (ref 26–34)
MCHC RBC AUTO-ENTMCNC: 33.1 G/DL (ref 32–36)
MCHC RBC AUTO-ENTMCNC: 33.1 G/DL (ref 32–36)
MCV RBC AUTO: 90 FL (ref 80–100)
MCV RBC AUTO: 90 FL (ref 80–100)
NRBC BLD-RTO: 0 /100 WBCS (ref 0–0)
NRBC BLD-RTO: 0 /100 WBCS (ref 0–0)
PLATELET # BLD AUTO: 172 X10*3/UL (ref 150–450)
PLATELET # BLD AUTO: 172 X10*3/UL (ref 150–450)
POTASSIUM SERPL-SCNC: 3.8 MMOL/L (ref 3.5–5.3)
POTASSIUM SERPL-SCNC: 3.8 MMOL/L (ref 3.5–5.3)
PROT SERPL-MCNC: 5.4 G/DL (ref 6.4–8.2)
PROT SERPL-MCNC: 5.4 G/DL (ref 6.4–8.2)
RBC # BLD AUTO: 4.22 X10*6/UL (ref 4–5.2)
RBC # BLD AUTO: 4.22 X10*6/UL (ref 4–5.2)
SODIUM SERPL-SCNC: 136 MMOL/L (ref 136–145)
SODIUM SERPL-SCNC: 136 MMOL/L (ref 136–145)
WBC # BLD AUTO: 6.9 X10*3/UL (ref 4.4–11.3)
WBC # BLD AUTO: 6.9 X10*3/UL (ref 4.4–11.3)

## 2024-07-04 PROCEDURE — 2500000002 HC RX 250 W HCPCS SELF ADMINISTERED DRUGS (ALT 637 FOR MEDICARE OP, ALT 636 FOR OP/ED)

## 2024-07-04 PROCEDURE — 2500000001 HC RX 250 WO HCPCS SELF ADMINISTERED DRUGS (ALT 637 FOR MEDICARE OP)

## 2024-07-04 PROCEDURE — 2500000004 HC RX 250 GENERAL PHARMACY W/ HCPCS (ALT 636 FOR OP/ED)

## 2024-07-04 PROCEDURE — 80053 COMPREHEN METABOLIC PANEL: CPT

## 2024-07-04 PROCEDURE — 2500000004 HC RX 250 GENERAL PHARMACY W/ HCPCS (ALT 636 FOR OP/ED): Performed by: OBSTETRICS & GYNECOLOGY

## 2024-07-04 PROCEDURE — 82947 ASSAY GLUCOSE BLOOD QUANT: CPT

## 2024-07-04 PROCEDURE — 85027 COMPLETE CBC AUTOMATED: CPT

## 2024-07-04 PROCEDURE — 10907ZC DRAINAGE OF AMNIOTIC FLUID, THERAPEUTIC FROM PRODUCTS OF CONCEPTION, VIA NATURAL OR ARTIFICIAL OPENING: ICD-10-PCS

## 2024-07-04 PROCEDURE — 1100000001 HC PRIVATE ROOM DAILY

## 2024-07-04 PROCEDURE — 2500000005 HC RX 250 GENERAL PHARMACY W/O HCPCS

## 2024-07-04 PROCEDURE — 7210000002 HC LABOR PER HOUR

## 2024-07-04 PROCEDURE — 51701 INSERT BLADDER CATHETER: CPT

## 2024-07-04 PROCEDURE — 3700000014 EPIDURAL BLOCK: Mod: GC

## 2024-07-04 PROCEDURE — 36415 COLL VENOUS BLD VENIPUNCTURE: CPT

## 2024-07-04 PROCEDURE — 99199 UNLISTED SPECIAL SVC PX/RPRT: CPT

## 2024-07-04 PROCEDURE — 3E033VJ INTRODUCTION OF OTHER HORMONE INTO PERIPHERAL VEIN, PERCUTANEOUS APPROACH: ICD-10-PCS

## 2024-07-04 RX ORDER — METOCLOPRAMIDE HYDROCHLORIDE 5 MG/ML
10 INJECTION INTRAMUSCULAR; INTRAVENOUS ONCE
Status: DISCONTINUED | OUTPATIENT
Start: 2024-07-04 | End: 2024-07-06

## 2024-07-04 RX ORDER — METOCLOPRAMIDE HYDROCHLORIDE 5 MG/ML
10 INJECTION INTRAMUSCULAR; INTRAVENOUS EVERY 6 HOURS PRN
Status: DISCONTINUED | OUTPATIENT
Start: 2024-07-04 | End: 2024-07-06

## 2024-07-04 RX ORDER — DIPHENHYDRAMINE HYDROCHLORIDE 50 MG/ML
25 INJECTION INTRAMUSCULAR; INTRAVENOUS ONCE
Status: COMPLETED | OUTPATIENT
Start: 2024-07-04 | End: 2024-07-04

## 2024-07-04 RX ORDER — NIFEDIPINE 30 MG/1
30 TABLET, FILM COATED, EXTENDED RELEASE ORAL ONCE
Status: COMPLETED | OUTPATIENT
Start: 2024-07-04 | End: 2024-07-04

## 2024-07-04 RX ORDER — ACETAMINOPHEN 325 MG/1
975 TABLET ORAL ONCE
Status: COMPLETED | OUTPATIENT
Start: 2024-07-04 | End: 2024-07-04

## 2024-07-04 RX ORDER — DIPHENHYDRAMINE HCL 25 MG
25 CAPSULE ORAL ONCE
Status: COMPLETED | OUTPATIENT
Start: 2024-07-04 | End: 2024-07-04

## 2024-07-04 RX ORDER — ACETAMINOPHEN 325 MG/1
650 TABLET ORAL EVERY 6 HOURS PRN
Status: DISCONTINUED | OUTPATIENT
Start: 2024-07-04 | End: 2024-07-06

## 2024-07-04 RX ORDER — FENTANYL/BUPIVACAINE/NS/PF 2MCG/ML-.1
PLASTIC BAG, INJECTION (ML) INJECTION AS NEEDED
Status: DISCONTINUED | OUTPATIENT
Start: 2024-07-04 | End: 2024-07-05

## 2024-07-04 RX ORDER — OXYTOCIN/0.9 % SODIUM CHLORIDE 30/500 ML
2-30 PLASTIC BAG, INJECTION (ML) INTRAVENOUS CONTINUOUS
Status: DISCONTINUED | OUTPATIENT
Start: 2024-07-04 | End: 2024-07-06

## 2024-07-04 RX ORDER — NIFEDIPINE 90 MG/1
90 TABLET, EXTENDED RELEASE ORAL
Status: DISCONTINUED | OUTPATIENT
Start: 2024-07-05 | End: 2024-07-05

## 2024-07-04 RX ORDER — CAFFEINE 200 MG
200 TABLET ORAL ONCE
Status: COMPLETED | OUTPATIENT
Start: 2024-07-04 | End: 2024-07-04

## 2024-07-04 RX ORDER — FENTANYL/BUPIVACAINE/NS/PF 2MCG/ML-.1
0-54 PLASTIC BAG, INJECTION (ML) INJECTION CONTINUOUS
Status: DISCONTINUED | OUTPATIENT
Start: 2024-07-04 | End: 2024-07-06

## 2024-07-04 SDOH — HEALTH STABILITY: MENTAL HEALTH: CURRENT SMOKER: 0

## 2024-07-04 ASSESSMENT — PAIN SCALES - GENERAL
PAINLEVEL_OUTOF10: 0 - NO PAIN
PAINLEVEL_OUTOF10: 0 - NO PAIN
PAINLEVEL_OUTOF10: 6
PAINLEVEL_OUTOF10: 2
PAINLEVEL_OUTOF10: 3
PAINLEVEL_OUTOF10: 0 - NO PAIN
PAINLEVEL_OUTOF10: 0 - NO PAIN
PAINLEVEL_OUTOF10: 4
PAINLEVEL_OUTOF10: 0 - NO PAIN
PAINLEVEL_OUTOF10: 0 - NO PAIN
PAINLEVEL_OUTOF10: 3
PAINLEVEL_OUTOF10: 4
PAINLEVEL_OUTOF10: 0 - NO PAIN
PAINLEVEL_OUTOF10: 2
PAINLEVEL_OUTOF10: 0 - NO PAIN
PAINLEVEL_OUTOF10: 3
PAINLEVEL_OUTOF10: 0 - NO PAIN
PAINLEVEL_OUTOF10: 5 - MODERATE PAIN
PAINLEVEL_OUTOF10: 0 - NO PAIN

## 2024-07-04 ASSESSMENT — PAIN DESCRIPTION - DESCRIPTORS
DESCRIPTORS: ACHING
DESCRIPTORS: ACHING

## 2024-07-04 NOTE — SIGNIFICANT EVENT
LABOR PROGRESS NOTE  SUBJECTIVE  Patient doing well. Feeling intermittent contractions. She has no HA, no vision changes, no RUQ pain. She has no CP, no SOB.       OBJECTIVE  Visit Vitals  BP (!) 145/84   Pulse 70   Temp 36.6 °C (97.9 °F) (Temporal)   Resp 16        Cervical Exam  Dilation: 2  Effacement (%): 80  Fetal Station: -2  Method: Manual  OB Examiner: Brenda DANIELLE  Fetal Assessment  Movement: Present  Mode: External US  Baseline Fetal Heart Rate (bpm): 115 bpm  Baseline Classification: Normal  Variability: Moderate (Between 6 and 25 BPM)  Pattern: Accelerations  Pattern Observations: difficulty tracing FHR d/t patient habitus. RN at bedside readjusting monitor at this time  FHR Category: Category I   Fetal Decelerations: No  Contraction Frequency: none    A&P    IOL  - Continue to monitor for cervical change  - initiate pitocin per protocol    Labor course  2000 CRB, Cyto#1   0000 CRB out, for pit    T1DM  On Insulin GTT with pump off  2230 drip on, poct 75, no insulin, D10W  2330 poct 95, no insulin, D10W  0030 poct 135, bolus 3u, basal 0.6u insulin    sPEC  - P:C 1.38, diagnosed by severe range Bps requiring IV tx  - Last tx Labetalol 1730 7/3  - HA 2/10--> resolved  - Nifed 60 (7/3)  - HELLP labs neg x1, repeat pending, P:C 1.38    Amine MD Brenda

## 2024-07-04 NOTE — SIGNIFICANT EVENT
LABOR PROGRESS NOTE  SUBJECTIVE  Patient doing well. Feeling intermittent contractions. She has no HA, no vision changes, no RUQ pain. She has no CP, no SOB.       OBJECTIVE  Visit Vitals  BP (!) 148/85   Pulse 91   Temp 36.7 °C (98.1 °F) (Temporal)   Resp 18        Cervical Exam  Dilation: 2  Effacement (%): 80  Fetal Station: -2  Method: Manual  OB Examiner: Brenda DANIELLE  Fetal Assessment  Movement: Present  Mode: External US  Baseline Fetal Heart Rate (bpm): 120 bpm  Baseline Classification: Normal  Variability: Moderate (Between 6 and 25 BPM)  Pattern: Accelerations  Pattern Observations: difficulty tracing FHR d/t patient habitus. RN at bedside readjusting monitor at this time  FHR Category: Category I   Fetal Decelerations: No  Contraction Frequency: 2-3    Lungs: CTAB    A&P    IOL  - Continue to monitor for cervical change  - initiate pitocin per protocol    Labor course  2000 CRB, Cyto#1   0000 CRB out, for pit  0130 pit  0245 2/80/-2  0645 2/80/-2, fetal head ballotable, consider AROM in 2 hours    T1DM  On Insulin GTT with pump off  2230 drip on, poct 75, no insulin, D10W  2330 poct 95, no insulin, D10W  0030 poct 135, bolus 3u, basal 0.6u insulin  0200 POCT 118 bolus 3u, basal 1.2u insulin    sPEC  - P:C 1.38, diagnosed by severe range Bps requiring IV tx  - Last tx Labetalol 1730 7/3  - HA 2/10--> resolved  - Nifed 60 (7/3)  - HELLP labs neg x2  - now on 1L of oxygen, likely FAHEEM, only when sleeping lungs clear    Nakul Nieves MD

## 2024-07-04 NOTE — PROGRESS NOTES
Intrapartum Progress Note    Assessment/Plan   Estefani Alvarenga is a 29 y.o.  at 35w1d. BJORN: 2024, by Ultrasound admitted for IOL in setting of PEC w severe features     IOL - Latent  - s/p ripening  - Continue pitocin per protocol  - AROM at 1000 for clear  - Last SVE: /-2  - epidural per patient request  - GBS neg     PEC w Severe features  - P:C 1.38, diagnosed by severe range Bps requiring IV tx  - Last tx Labetalol 1730 7/3; hydral 5 0800  - Previously with HA, resolved s/p t/b/r. Currently asymptomatic  - Nifed 60 --> 90   - HELLP labs neg x2, P:C 1.38  - Intermittent O2 requirement while sleeping, LFCB. Low concern for pulmonary edema at this time. Continue to monitor closely.     T1DM  - Continue insulin gtt - table 2  - Currently euglycemic  - Home regimen includes CGM/Pump in auto mode, for MFM recs postpartum     Hx cardiomyopathy  - Cont metop 25 BID  - Echo 2024 wnl     Guillian-[place] syndrome, polyradiculopathy  - Cont pregabalin     Fetal status:   - Cat 1 tracing. Lower baseline. Overall reassuring   - Last Growth:  : EFW 2810 g 77%, AC 91%    Seen and discussed with Dr. Rockwell.     Sherri Slater MD   PGY-2, Labor and Delivery     Principal Problem:    Preeclampsia, third trimester ([facility]-H[facility])  Active Problems:    Diabetes mellitus type 1 (Multi)    Hyperlipidemia    Neuromuscular disease (Multi)    Hypothyroidism    Pregnancy Problems (from 24 to present)       Problem Noted Resolved    Preeclampsia, third trimester ([facility]-H[facility]) 7/3/2024 by Ana Lilia Lopez MD No    Priority:  Medium              Subjective   Seen at bedside in NAD. Feeling contractions. Wants to try for NCB currently. Denies HA/Vision changes, CP/SOB, RUQ pain.    Objective   Last Vitals:  Temp Pulse Resp BP MAP Pulse Ox   36.2 °C (97.2 °F) 86 18 132/79   96 %     Vitals Min/Max Last 24 Hours:  Temp  Min: 36.1 °C (97 °F)  Max: 37 °C (98.6 °F)  Pulse  Min: 63  Max: 96  Resp  Min: 16  Max: 18  BP  Min: 115/67   Max: 164/95    Intake/Output:    Intake/Output Summary (Last 24 hours) at 7/4/2024 1925  Last data filed at 7/4/2024 1829  Gross per 24 hour   Intake 4400.59 ml   Output 1625 ml   Net 2775.59 ml       Physical Examination:  General: Lying in bed in NAD  Obstetric:   FHT: 100 bpm, moderate variability, + accels, - decels  SVE: 4/90/-2  Skin: No rashes/lesions/erythema  Neuro: Awake, alert, conversational  CV: Regular rate, warm and well perfused  Respiratory: Even and unlabored on RA  Extremities: No edema, discoloration, or pain in BLE  Psych: appropriate mood and affect     Lab Review:  Labs in chart were reviewed.

## 2024-07-04 NOTE — PROGRESS NOTES
Intrapartum Progress Note    Assessment/Plan   Estefani Alvarenga is a 29 y.o.  at 35w1d. BJORN: 2024, by Ultrasound admitted for IOL in setting of PEC w severe features    Latent labor  -s/p ripening  -continue pitocin  -AROMd 1000  -epidural per patient request  -GBS neg    PEC w Severe features  - P:C 1.38, diagnosed by severe range Bps requiring IV tx  - Last tx Labetalol 1730 7/3; hydral 5 0800  - HA 2/10 on admission, resolved with pain meds. Currently has headache, will treat with tylenol and caffiene  - Nifed 60 (7/3) --> 90   - HELLP labs neg x2, P:C 1.38    T1DM  -insulin gtt - table 2  -glucose well controlled    Hx cardiomyopathy  - Cont metop 25 BID  - Echo 2024 wnl     Guillian-[place] syndrome, polyradiculopathy  - Cont pregabalin     Fetal status:   - cat1 tracing. Lower baseline. Overall reassuring   - Last Growth:  : EFW 2810 g 77%, AC 91%    Seen and discussed with Dr. Maite Baker MD PGY-2      Principal Problem:    Preeclampsia, third trimester ([facility])  Active Problems:    Diabetes mellitus type 1 (Multi)    Hyperlipidemia    Neuromuscular disease (Multi)    Hypothyroidism    Pregnancy Problems (from 24 to present)       Problem Noted Resolved    Preeclampsia, third trimester ([facility]) 7/3/2024 by Ana Lilia Lopez MD No    Priority:  Medium              Subjective   Patient feeling well. Patient does not report visual changes, chest pain, shortness of breath or RUQ pain. Has mild headache      Objective   Last Vitals:  Temp Pulse Resp BP MAP Pulse Ox   36.5 °C (97.7 °F) 80 18 (!) 140/87   97 %     Vitals Min/Max Last 24 Hours:  Temp  Min: 36.1 °C (97 °F)  Max: 36.7 °C (98.1 °F)  Pulse  Min: 63  Max: 94  Resp  Min: 16  Max: 20  BP  Min: 118/64  Max: 183/98    Intake/Output:    Intake/Output Summary (Last 24 hours) at 2024 1041  Last data filed at 2024 0931  Gross per 24 hour   Intake 2873.14 ml   Output 650 ml   Net 2223.14 ml       Physical  Examination:  Physical exam:  Constitutional: No visible distress, alert and cooperative  Respiratory/Thorax: Normal respiratory effort on RA  Cardiovascular: Reg rate  Gastrointestinal: soft, nondistended, nontender, gravid  Neurological: A&Ox3  Psychological: Appropriate mood and behavior  SVE 2/80/-2.     Fundal pressure applied, Fetal head palpated with no other presenting parts. AROM for copious clear fluid.    FHT: 105/mod variability/+accels/-decels      Lab Review:  Labs in chart were reviewed.

## 2024-07-04 NOTE — ANESTHESIA PROCEDURE NOTES
Epidural Block    Patient location during procedure: OB  Start time: 7/4/2024 7:24 PM  End time: 7/4/2024 7:50 PM  Reason for block: labor analgesia  Staffing  Performed: resident and attending   Authorized by: Dimitry Devine DO    Performed by: Jennifer Rai MD    Preanesthetic Checklist  Completed: patient identified, IV checked, risks and benefits discussed, surgical consent, monitors and equipment checked, pre-op evaluation, timeout performed and sterile techniques followed  Block Timeout  RN/Licensed healthcare professional reads aloud to the Anesthesia provider and entire team: Patient identity, procedure with side and site, patient position, and as applicable the availability of implants/special equipment/special requirements.  Patient on coagulant treatment: no  Timeout performed at: 7/4/2024 7:28 PM  Block Placement  Patient position: sitting  Prep: ChloraPrep  Sterility prep: drape, cap, gloves and mask  Sedation level: no sedation  Patient monitoring: continuous pulse oximetry and blood pressure  Approach: midline  Local numbing: lidocaine 1% to skin and subcutaneous tissues  Vertebral space: lumbar  Lumbar location: L3-L4  Epidural  Loss of resistance technique: saline  Guidance: landmark technique        Needle  Needle type: Tuohy   Needle gauge: 17  Needle length: 8.9cm  Needle insertion depth: 6.5 cm  Catheter type: end hole  Catheter size: 19 G  Catheter at skin depth: 11.5 cm  Catheter securement method: liquid medical adhesive and clear occlusive dressing    Test dose: lidocaine 1.5% with epinephrine 1-to-200,000  Test dose: lidocaine 1.5% with epinephrine 1-to-200,000  Test dose result: no positive test dose    PCEA  Medication concentration used: 0.044% Bupivacaine with 1.25 mcg/mL Fentanyl and 1:[id number] Epinephrine  Dose (mL): 10  Lockout (minutes): 15  1-Hour Limit (boluses/hr): 4  Basal Rate: 14        Assessment  Sensory level: T10 bilateral  Block outcome: pain improved  Number of  attempts: 1  Events: no positive test dose  Procedure assessment: patient tolerated procedure well with no immediate complications

## 2024-07-04 NOTE — SIGNIFICANT EVENT
CRB    Patient was placed in dorsal lithotomy position, a speculum was placed, and a cervical ripening balloon was guided through the external and internal cervical os with a ring forceps. The balloon was inflated with 60cc of normal saline. Cyto #1 placed .  Pt tolerated the procedure well.    D/w Dr Kar Alejandra MD PGY2

## 2024-07-04 NOTE — ANESTHESIA PREPROCEDURE EVALUATION
Patient: Estefani Alvarenga    Evaluation Method: In-person visit    Procedure Information    Date: 07/03/24  Procedure: Labor Consult         Relevant Problems   Anesthesia (within normal limits)      Cardiac   (+) Hyperlipidemia (Stopped statin once pregnant)   (+) Preeclampsia, third trimester ([facility]-HCC)      Pulmonary (within normal limits)      Neuro   (+) Neuromuscular disease (Multi) (Guillan-[place] (2020), residual atrophy both lower extremities, foot drop left foot. Uses cane/walker in unfamiliar territories.)      /Renal (within normal limits)      Liver (within normal limits)      Endocrine   (+) Diabetes mellitus type 1 (Multi) (S/p DKA (2020))   (+) Hypothyroidism (S/p 2020, on synthroid)      Hematology (within normal limits)  On daily ASA 81mg      Musculoskeletal (within normal limits)       Clinical information reviewed:    Allergies  Meds               NPO Detail:  NPO/Void Status  Date of Last Solid: 07/03/24  Time of Last Solid: 1730         OB/Gyn Evaluation    Present Pregnancy    Patient is pregnant now.  (+) , hypertensive disorder of pregnancy - superimposed preeclampsia with severe features   Obstetric History                Physical Exam    Airway  Mallampati: II  TM distance: <3 FB  Neck ROM: full     Cardiovascular   Rhythm: regular  Rate: normal     Dental    Pulmonary   Breath sounds clear to auscultation     Abdominal      Other findings: L sided foot drop (residual defect from Guillan [place])          Anesthesia Plan    History of general anesthesia?: yes  History of complications of general anesthesia?: no    ASA 2     general     The patient is not a current smoker.    intravenous induction   Postoperative administration of opioids is intended.  Trial extubation is planned.  Anesthetic plan and risks discussed with patient.  Use of blood products discussed with patient who consented to blood products.    Plan discussed with attending.

## 2024-07-04 NOTE — SIGNIFICANT EVENT
LABOR PROGRESS NOTE  SUBJECTIVE  Patient doing well. Feeling intermittent contractions. She has no HA, no vision changes, no RUQ pain. She has no CP, no SOB.       OBJECTIVE  Visit Vitals  BP (!) 149/83   Pulse 69   Temp 36.6 °C (97.9 °F) (Temporal)   Resp 16        Cervical Exam  Dilation: 2  Effacement (%): 80  Fetal Station: -2  Method: Manual  OB Examiner: Brenda DANIELLE  Fetal Assessment  Movement: Present  Mode: External US  Baseline Fetal Heart Rate (bpm): 120 bpm  Baseline Classification: Normal  Variability: Moderate (Between 6 and 25 BPM)  Pattern: Accelerations  Pattern Observations: difficulty tracing FHR d/t patient habitus. RN at bedside readjusting monitor at this time  FHR Category: Category I   Fetal Decelerations: No  Contraction Frequency: 5-6    Lungs: CTAB    A&P    IOL  - Continue to monitor for cervical change  - initiate pitocin per protocol    Labor course  2000 CRB, Cyto#1   0000 CRB out, for pit  0130 pit  0245 2/80/-2    T1DM  On Insulin GTT with pump off  2230 drip on, poct 75, no insulin, D10W  2330 poct 95, no insulin, D10W  0030 poct 135, bolus 3u, basal 0.6u insulin  0200 POCT 118 bolus 3u, basal 1.2u insulin    sPEC  - P:C 1.38, diagnosed by severe range Bps requiring IV tx  - Last tx Labetalol 1730 7/3  - HA 2/10--> resolved  - Nifed 60 (7/3)  - HELLP labs neg x2  - now on 1L of oxygen, likely FAHEEM, only when sleeping lungs clear    Amine MD Brenda

## 2024-07-04 NOTE — SIGNIFICANT EVENT
Labor Progress Note    Subjective:   Patient resting comfortably in bed    Objective:  Vitals:  /65   Pulse 88   Temp 37 °C (98.6 °F) (Temporal)   Resp 18   SpO2 97%   FHTs: baseline 105 moderate variability, +accels, -decels  [place]: ctx q2-4min  SVE: 2/80/-2    A/P:    Latent labor  - Titrate pitocin per protocol, currently at 14  - s/p AROM  - plan for unmedicated, epidural available per patient request  - CEFM; Cat 1 currently  - GBS neg  - continue position changes    Em Baker MD PGY-2

## 2024-07-05 PROBLEM — O14.93: Status: ACTIVE | Noted: 2024-07-05

## 2024-07-05 LAB
BASE EXCESS BLDCOA CALC-SCNC: -3.1 MMOL/L (ref -10.8–-0.5)
BASE EXCESS BLDCOA CALC-SCNC: -3.1 MMOL/L (ref -10.8–-0.5)
BASE EXCESS BLDCOV CALC-SCNC: -3.8 MMOL/L (ref -8.1–-0.5)
BASE EXCESS BLDCOV CALC-SCNC: -3.8 MMOL/L (ref -8.1–-0.5)
BODY TEMPERATURE: 37 DEGREES CELSIUS
GLUCOSE BLD MANUAL STRIP-MCNC: 109 MG/DL (ref 74–99)
GLUCOSE BLD MANUAL STRIP-MCNC: 109 MG/DL (ref 74–99)
GLUCOSE BLD MANUAL STRIP-MCNC: 117 MG/DL (ref 74–99)
GLUCOSE BLD MANUAL STRIP-MCNC: 117 MG/DL (ref 74–99)
GLUCOSE BLD MANUAL STRIP-MCNC: 134 MG/DL (ref 74–99)
GLUCOSE BLD MANUAL STRIP-MCNC: 134 MG/DL (ref 74–99)
GLUCOSE BLD MANUAL STRIP-MCNC: 167 MG/DL (ref 74–99)
GLUCOSE BLD MANUAL STRIP-MCNC: 167 MG/DL (ref 74–99)
GLUCOSE BLD MANUAL STRIP-MCNC: 70 MG/DL (ref 74–99)
GLUCOSE BLD MANUAL STRIP-MCNC: 70 MG/DL (ref 74–99)
GLUCOSE BLD MANUAL STRIP-MCNC: 76 MG/DL (ref 74–99)
GLUCOSE BLD MANUAL STRIP-MCNC: 76 MG/DL (ref 74–99)
GLUCOSE BLD MANUAL STRIP-MCNC: 91 MG/DL (ref 74–99)
GLUCOSE BLD MANUAL STRIP-MCNC: 91 MG/DL (ref 74–99)
GLUCOSE BLD MANUAL STRIP-MCNC: 95 MG/DL (ref 74–99)
GLUCOSE BLD MANUAL STRIP-MCNC: 95 MG/DL (ref 74–99)
HCO3 BLDCOA-SCNC: 25 MMOL/L (ref 15–29)
HCO3 BLDCOA-SCNC: 25 MMOL/L (ref 15–29)
HCO3 BLDCOV-SCNC: 21.5 MMOL/L (ref 16–26)
HCO3 BLDCOV-SCNC: 21.5 MMOL/L (ref 16–26)
INHALED O2 CONCENTRATION: 21 %
OXYHGB MFR BLDCOA: 32.7 % (ref 94–98)
OXYHGB MFR BLDCOA: 32.7 % (ref 94–98)
OXYHGB MFR BLDCOV: 78.7 % (ref 94–98)
OXYHGB MFR BLDCOV: 78.7 % (ref 94–98)
PCO2 BLDCOA: 57 MM HG (ref 31–75)
PCO2 BLDCOA: 57 MM HG (ref 31–75)
PCO2 BLDCOV: 39 MM HG (ref 22–53)
PCO2 BLDCOV: 39 MM HG (ref 22–53)
PH BLDCOA: 7.25 PH (ref 7.08–7.39)
PH BLDCOA: 7.25 PH (ref 7.08–7.39)
PH BLDCOV: 7.35 PH (ref 7.19–7.47)
PH BLDCOV: 7.35 PH (ref 7.19–7.47)
PO2 BLDCOA: 17 MM HG (ref 5–31)
PO2 BLDCOA: 17 MM HG (ref 5–31)
PO2 BLDCOV: 39 MM HG (ref 13–37)
PO2 BLDCOV: 39 MM HG (ref 13–37)
SAO2 % BLDCOA: 33 % (ref 3–69)
SAO2 % BLDCOA: 33 % (ref 3–69)
SAO2 % BLDCOV: 81 % (ref 16–84)
SAO2 % BLDCOV: 81 % (ref 16–84)

## 2024-07-05 PROCEDURE — 99199 UNLISTED SPECIAL SVC PX/RPRT: CPT

## 2024-07-05 PROCEDURE — 2500000004 HC RX 250 GENERAL PHARMACY W/ HCPCS (ALT 636 FOR OP/ED)

## 2024-07-05 PROCEDURE — 2500000002 HC RX 250 W HCPCS SELF ADMINISTERED DRUGS (ALT 637 FOR MEDICARE OP, ALT 636 FOR OP/ED)

## 2024-07-05 PROCEDURE — 7100000016 HC LABOR RECOVERY PER HOUR

## 2024-07-05 PROCEDURE — 2500000002 HC RX 250 W HCPCS SELF ADMINISTERED DRUGS (ALT 637 FOR MEDICARE OP, ALT 636 FOR OP/ED): Performed by: STUDENT IN AN ORGANIZED HEALTH CARE EDUCATION/TRAINING PROGRAM

## 2024-07-05 PROCEDURE — 2500000001 HC RX 250 WO HCPCS SELF ADMINISTERED DRUGS (ALT 637 FOR MEDICARE OP): Performed by: STUDENT IN AN ORGANIZED HEALTH CARE EDUCATION/TRAINING PROGRAM

## 2024-07-05 PROCEDURE — 2500000001 HC RX 250 WO HCPCS SELF ADMINISTERED DRUGS (ALT 637 FOR MEDICARE OP)

## 2024-07-05 PROCEDURE — 59409 OBSTETRICAL CARE: CPT | Performed by: OBSTETRICS & GYNECOLOGY

## 2024-07-05 PROCEDURE — 59409 OBSTETRICAL CARE: CPT

## 2024-07-05 PROCEDURE — 82947 ASSAY GLUCOSE BLOOD QUANT: CPT

## 2024-07-05 PROCEDURE — 7210000002 HC LABOR PER HOUR

## 2024-07-05 PROCEDURE — 88307 TISSUE EXAM BY PATHOLOGIST: CPT | Mod: TC,SUR

## 2024-07-05 PROCEDURE — 82805 BLOOD GASES W/O2 SATURATION: CPT

## 2024-07-05 PROCEDURE — 1100000001 HC PRIVATE ROOM DAILY

## 2024-07-05 RX ORDER — LABETALOL HYDROCHLORIDE 5 MG/ML
20 INJECTION, SOLUTION INTRAVENOUS ONCE AS NEEDED
Status: DISCONTINUED | OUTPATIENT
Start: 2024-07-05 | End: 2024-07-09 | Stop reason: HOSPADM

## 2024-07-05 RX ORDER — METOCLOPRAMIDE HYDROCHLORIDE 5 MG/ML
10 INJECTION INTRAMUSCULAR; INTRAVENOUS EVERY 6 HOURS PRN
Status: DISCONTINUED | OUTPATIENT
Start: 2024-07-05 | End: 2024-07-09 | Stop reason: HOSPADM

## 2024-07-05 RX ORDER — OXYTOCIN/0.9 % SODIUM CHLORIDE 30/500 ML
60 PLASTIC BAG, INJECTION (ML) INTRAVENOUS ONCE AS NEEDED
Status: DISCONTINUED | OUTPATIENT
Start: 2024-07-05 | End: 2024-07-09 | Stop reason: HOSPADM

## 2024-07-05 RX ORDER — SIMETHICONE 80 MG
80 TABLET,CHEWABLE ORAL 4 TIMES DAILY PRN
Status: DISCONTINUED | OUTPATIENT
Start: 2024-07-05 | End: 2024-07-09 | Stop reason: HOSPADM

## 2024-07-05 RX ORDER — INSULIN LISPRO 100 [IU]/ML
3 INJECTION, SOLUTION INTRAVENOUS; SUBCUTANEOUS AS NEEDED
Status: DISCONTINUED | OUTPATIENT
Start: 2024-07-05 | End: 2024-07-09 | Stop reason: HOSPADM

## 2024-07-05 RX ORDER — NIFEDIPINE 30 MG/1
30 TABLET, FILM COATED, EXTENDED RELEASE ORAL ONCE
Status: COMPLETED | OUTPATIENT
Start: 2024-07-05 | End: 2024-07-05

## 2024-07-05 RX ORDER — ACETAMINOPHEN 325 MG/1
975 TABLET ORAL EVERY 6 HOURS SCHEDULED
Status: DISCONTINUED | OUTPATIENT
Start: 2024-07-05 | End: 2024-07-09 | Stop reason: HOSPADM

## 2024-07-05 RX ORDER — CARBOPROST TROMETHAMINE 250 UG/ML
250 INJECTION, SOLUTION INTRAMUSCULAR ONCE AS NEEDED
Status: DISCONTINUED | OUTPATIENT
Start: 2024-07-05 | End: 2024-07-09 | Stop reason: HOSPADM

## 2024-07-05 RX ORDER — NIFEDIPINE 60 MG/1
60 TABLET, FILM COATED, EXTENDED RELEASE ORAL 2 TIMES DAILY
Status: DISCONTINUED | OUTPATIENT
Start: 2024-07-05 | End: 2024-07-05

## 2024-07-05 RX ORDER — DIPHENHYDRAMINE HCL 25 MG
25 CAPSULE ORAL EVERY 6 HOURS PRN
Status: DISCONTINUED | OUTPATIENT
Start: 2024-07-05 | End: 2024-07-09 | Stop reason: HOSPADM

## 2024-07-05 RX ORDER — AMOXICILLIN 250 MG
2 CAPSULE ORAL NIGHTLY PRN
Status: DISCONTINUED | OUTPATIENT
Start: 2024-07-05 | End: 2024-07-09 | Stop reason: HOSPADM

## 2024-07-05 RX ORDER — BISACODYL 10 MG/1
10 SUPPOSITORY RECTAL DAILY PRN
Status: DISCONTINUED | OUTPATIENT
Start: 2024-07-05 | End: 2024-07-09 | Stop reason: HOSPADM

## 2024-07-05 RX ORDER — NIFEDIPINE 10 MG/1
10 CAPSULE ORAL ONCE AS NEEDED
Status: DISCONTINUED | OUTPATIENT
Start: 2024-07-05 | End: 2024-07-09 | Stop reason: HOSPADM

## 2024-07-05 RX ORDER — ADHESIVE BANDAGE
10 BANDAGE TOPICAL
Status: DISCONTINUED | OUTPATIENT
Start: 2024-07-05 | End: 2024-07-09 | Stop reason: HOSPADM

## 2024-07-05 RX ORDER — OXYTOCIN 10 [USP'U]/ML
10 INJECTION, SOLUTION INTRAMUSCULAR; INTRAVENOUS ONCE AS NEEDED
Status: DISCONTINUED | OUTPATIENT
Start: 2024-07-05 | End: 2024-07-09 | Stop reason: HOSPADM

## 2024-07-05 RX ORDER — POLYETHYLENE GLYCOL 3350 17 G/17G
17 POWDER, FOR SOLUTION ORAL 2 TIMES DAILY PRN
Status: DISCONTINUED | OUTPATIENT
Start: 2024-07-05 | End: 2024-07-09 | Stop reason: HOSPADM

## 2024-07-05 RX ORDER — METOCLOPRAMIDE 10 MG/1
10 TABLET ORAL EVERY 6 HOURS PRN
Status: DISCONTINUED | OUTPATIENT
Start: 2024-07-05 | End: 2024-07-09 | Stop reason: HOSPADM

## 2024-07-05 RX ORDER — LIDOCAINE 560 MG/1
1 PATCH PERCUTANEOUS; TOPICAL; TRANSDERMAL
Status: DISCONTINUED | OUTPATIENT
Start: 2024-07-05 | End: 2024-07-09 | Stop reason: HOSPADM

## 2024-07-05 RX ORDER — METHYLERGONOVINE MALEATE 0.2 MG/ML
0.2 INJECTION INTRAVENOUS ONCE AS NEEDED
Status: DISCONTINUED | OUTPATIENT
Start: 2024-07-05 | End: 2024-07-09 | Stop reason: HOSPADM

## 2024-07-05 RX ORDER — ENALAPRIL MALEATE 10 MG/1
10 TABLET ORAL DAILY
Status: DISCONTINUED | OUTPATIENT
Start: 2024-07-05 | End: 2024-07-09 | Stop reason: HOSPADM

## 2024-07-05 RX ORDER — NIFEDIPINE 60 MG/1
60 TABLET, FILM COATED, EXTENDED RELEASE ORAL 2 TIMES DAILY
Status: DISCONTINUED | OUTPATIENT
Start: 2024-07-05 | End: 2024-07-09 | Stop reason: HOSPADM

## 2024-07-05 RX ORDER — ONDANSETRON 4 MG/1
4 TABLET, FILM COATED ORAL EVERY 6 HOURS PRN
Status: DISCONTINUED | OUTPATIENT
Start: 2024-07-05 | End: 2024-07-09 | Stop reason: HOSPADM

## 2024-07-05 RX ORDER — LOPERAMIDE HYDROCHLORIDE 2 MG/1
4 CAPSULE ORAL EVERY 2 HOUR PRN
Status: DISCONTINUED | OUTPATIENT
Start: 2024-07-05 | End: 2024-07-09 | Stop reason: HOSPADM

## 2024-07-05 RX ORDER — MISOPROSTOL 200 UG/1
800 TABLET ORAL ONCE AS NEEDED
Status: DISCONTINUED | OUTPATIENT
Start: 2024-07-05 | End: 2024-07-09 | Stop reason: HOSPADM

## 2024-07-05 RX ORDER — ONDANSETRON HYDROCHLORIDE 2 MG/ML
4 INJECTION, SOLUTION INTRAVENOUS EVERY 6 HOURS PRN
Status: DISCONTINUED | OUTPATIENT
Start: 2024-07-05 | End: 2024-07-09 | Stop reason: HOSPADM

## 2024-07-05 RX ORDER — TRANEXAMIC ACID 100 MG/ML
1000 INJECTION, SOLUTION INTRAVENOUS ONCE AS NEEDED
Status: DISCONTINUED | OUTPATIENT
Start: 2024-07-05 | End: 2024-07-09 | Stop reason: HOSPADM

## 2024-07-05 RX ORDER — HYDRALAZINE HYDROCHLORIDE 20 MG/ML
5 INJECTION INTRAMUSCULAR; INTRAVENOUS ONCE AS NEEDED
Status: DISCONTINUED | OUTPATIENT
Start: 2024-07-05 | End: 2024-07-09 | Stop reason: HOSPADM

## 2024-07-05 RX ORDER — DIPHENHYDRAMINE HYDROCHLORIDE 50 MG/ML
25 INJECTION INTRAMUSCULAR; INTRAVENOUS EVERY 6 HOURS PRN
Status: DISCONTINUED | OUTPATIENT
Start: 2024-07-05 | End: 2024-07-09 | Stop reason: HOSPADM

## 2024-07-05 RX ORDER — IBUPROFEN 600 MG/1
600 TABLET ORAL EVERY 6 HOURS SCHEDULED
Status: DISCONTINUED | OUTPATIENT
Start: 2024-07-05 | End: 2024-07-09 | Stop reason: HOSPADM

## 2024-07-05 ASSESSMENT — PAIN SCALES - GENERAL
PAINLEVEL_OUTOF10: 0 - NO PAIN
PAINLEVEL_OUTOF10: 1
PAINLEVEL_OUTOF10: 0 - NO PAIN

## 2024-07-05 ASSESSMENT — PAIN SCALES - WONG BAKER: WONGBAKER_NUMERICALRESPONSE: NO HURT

## 2024-07-05 ASSESSMENT — ACTIVITIES OF DAILY LIVING (ADL): LACK_OF_TRANSPORTATION: NO

## 2024-07-05 ASSESSMENT — PAIN DESCRIPTION - DESCRIPTORS: DESCRIPTORS: ACHING

## 2024-07-05 NOTE — PROGRESS NOTES
Postpartum Progress Note    Assessment/Plan   Estefani Alvarenga is a 29 y.o., , who delivered at 35w2d gestation and is now postpartum day 0 from a . She has preeclampsia with severe features now on 24 hours of postpartum Mg. She also has T1DM    s/p  PPD#0  -Pain well controlled with PO pain meds  -Afebrile  -Tolerating regular diet with anti-emetics PRN and bowel regimen ordered  -Glover in place 2/2 Mag  -Admission hgb 12.5 --> ; Continue to monitor for si/sx of anemia.   -Continue routine postpartum care     Preeclampsia with severe features  - P:C 1.38, diagnosed by severe range Bps requiring IV tx  - Last tx Labetalol 1730 7/3; hydral 5 0800   - intermittent headache, resolves with pain meds  - Nifed 60 (7/3) --> 90  > Nifed 60mg BID 7 AM  - PEC labs nl x2  - Mg gtt until 0030     T1DM  - s/p insulin gtt intrapartum  - Has CGM, overnight switched back to pump to most recent pregnancy settings  - Auto mode  12:00 AM (6 hr) 9 g/Unit  6:00 AM (11 hr) 5 g/Unit   5:00 PM (7 hr) 6g/Unit  - currently euglycemic  - follow up MFM postpartum pump plan    Maternal well-being  -hypothyroidism, continue synthroid    Feeding - breast/formula  -Continue to encourage breast feeding  -Lactation consult as needed    Postpartum Birth control  -Plans for POPs as bridge to nuvaring    DVT prophylaxis  -VTE risk score = 3  -SCDs  -Ambulation    Dispo: anticipate discharge on PPD#3 if continues to meet milestones. Plan for follow up in 5-7 days for BP check and 4-6 weeks for postpartum visit with OB provider.    Seen and discussed with Dr. Loyda Baker MD PGY-2      Principal Problem:    Preeclampsia, third trimester ([facility])  Active Problems:    Diabetes mellitus type 1 (Multi)    Hyperlipidemia    Neuromuscular disease (Multi)    Hypothyroidism    Pregnancy Problems (from 24 to present)       Problem Noted Resolved    Preeclampsia, third trimester ([facility]) 7/3/2024 by Ana Lilia ARCHER  MD Jessica No    Priority:  Medium            Hospital course:   Preeclampsia with severe features  Vaginal Birth   The patient's blood type is O POS. The baby's blood type is B POS . Rhogam is not indicated.    Subjective   Her pain is well controlled with current medications  She is tolerating a Adult diet Regular  She reports no breast or nursing problems  She denies emotional concerns today   Her plan for contraception is POPs as bridge to nuvaring     Patient does not report headaches, visual changes, chest pain, shortness of breath or RUQ pain      Objective   Allergies:   Shellfish derived         Last Vitals:  Temp Pulse Resp BP MAP Pulse Ox   36.8 °C (98.2 °F) 82 20 (!) 159/104   98 %     Vitals Min/Max Last 24 Hours:  Temp  Min: 36.2 °C (97.2 °F)  Max: 37 °C (98.6 °F)  Pulse  Min: 69  Max: 106  Resp  Min: 16  Max: 20  BP  Min: 115/67  Max: 164/95    Intake/Output:     Intake/Output Summary (Last 24 hours) at 7/5/2024 0826  Last data filed at 7/5/2024 0739  Gross per 24 hour   Intake 5602.33 ml   Output 2587 ml   Net 3015.33 ml       Physical Exam:  General: well appearing, well-nourished  Obstetric: abdomen soft/non-tender, fundus firm below umbilicus, lochia light.   Skin: No rashes/lesions/erythema  Respiratory: no excessive respiratory effort. Lungs clear to auscultation bilaterally  Cardiovascular: warm and well perfused. Regular rate and rhythm   Neuro: A/Ox3, conversational; DTRs 2+  Psych: appropriate mood and affect      Lab Data:  Labs in chart were reviewed.

## 2024-07-05 NOTE — L&D DELIVERY NOTE
OB Delivery Note  2024  Estefani Alvarenga  29 y.o.   Vaginal, Spontaneous        Gestational Age: 35w2d  /Para:   Quantitative Blood Loss: Admission to Discharge: 312 mL (7/3/2024 12:04 PM - 2024  4:15 AM)    Hien Alvarenga [[id number]]      Labor Events    Rupture date/time: 2024 0959  Rupture type: Artificial  Fluid color: Clear  Fluid odor: None  Labor type: Induced Onset of Labor  Labor allowed to proceed with plans for an attempted vaginal birth?: Yes  Induction: Oxytocin  First cervical ripening date/time: 7/3/2024 1958  Induction date/time: 7/3/2024 1743  Induction indications: Hypertensive Disorder of Pregnancy  Complications: None       Labor Event Times    Labor onset date/time: 7/3/2024 1743  Dilation complete date/time: 20245  Start pushing date/time: 2024 2359       Labor Length    1st stage: 29h 12m  2nd stage: 1h 45m  3rd stage: 0h 03m       Placenta    Placenta delivery date/time: 2024  Placenta removal: Spontaneous  Placenta appearance: Intact  Placenta disposition: pathology       Cord    Vessels: 3 vessels  Complications: None  Delayed cord clamping?: Yes  Cord clamped date/time: 2024  Cord blood disposition: Lab  Gases sent?: Yes  Stem cell collection (by provider): No       Lacerations    Episiotomy: None  Perineal laceration: None  Other lacerations?: No  Repair suture: None       Anesthesia    Method: Epidural       Operative Delivery    Forceps attempted?: No  Vacuum extractor attempted?: No       Shoulder Dystocia    Shoulder dystocia present?: No       [place] Delivery    Birth date/time: 2024 00:40:00  Delivery type: Vaginal, Spontaneous  Complications: None       Resuscitation    Method: Suctioning, Positive pressure ventilation (PPV), Endotracheal Intubation, Tactile stimulation       Apgars    Living status: Living  Apgar Component Scores:  1 min.:  5 min.:  10 min.:  15 min.:  20 min.:    Skin color:  0  1  1  1  1    Heart  rate:  1  2  2  2  2    Reflex irritability:  1  1  1  1  1    Muscle tone:  0  1  1  1  1    Respiratory effort:  0  0  0  0  0    Total:  2  5  5  5  5    Apgars assigned by: CHERI       Delivery Providers    Delivering clinician: Luciano Rockwell MD   Provider Role    Corinne Foote, RN Delivery Nurse    Karan Mora, RN Nursery Nurse    Sherri Slater MD Resident                 Intrauterine Device Inserted : No, patient to use POPs    Female infant delivered in the standard fashion with placenta following spontaneously. Moderate bleeding that resolved with bimanual exam. Perineum intact.  Baby to NICU following poor respiratory effort.    Dr. Rockwell was present for the entire birth.    Sherri Slater MD  PGY-2, Labor and Delivery

## 2024-07-05 NOTE — PROGRESS NOTES
Postpartum Progress Note    Assessment/Plan   Estefani Alvarenga is a 29 y.o., , who delivered at 35w2d gestation and is now postpartum day 0 from a . She has preeclampsia with severe features now on 24 hours of postpartum Mg. She also has T1DM    s/p  PPD#0  -Pain well controlled with PO pain meds  -Afebrile  -Tolerating regular diet with anti-emetics PRN and bowel regimen ordered  -Glover in place 2/2 Mag  -Admission hgb 12.5 --> ; Continue to monitor for si/sx of anemia.   -Continue routine postpartum care     Preeclampsia with severe features  - P:C 1.38, diagnosed by severe range Bps requiring IV tx  - Last tx Labetalol 1730 7/3; hydral 5 0800 ; labetalol 20  0900  - intermittent headache, resolves with pain meds  - Nifed 60 (7/3) --> 90  > Nifed 60mg BID + enalapril 10mg  AM  - PEC labs nl x2  - Mg gtt until 003    T1DM  - s/p insulin gtt intrapartum  - Has CGM, overnight switched back to pump to most recent pregnancy settings  - Auto mode for basal rate  - Bolus carb rations below:   12:00 AM (6 hr) 18 g/Unit  6:00 AM (11 hr) 10  g/Unit   5:00 PM (7 hr) 12g/Unit  - currently euglycemic    Maternal well-being  -hypothyroidism, continue synthroid    Feeding - breast/formula  -Continue to encourage breast feeding  -Lactation consult as needed    Postpartum Birth control  -Plans for POPs as bridge to nuvaring    DVT prophylaxis  -VTE risk score = 3  -SCDs  -Ambulation    Dispo: anticipate discharge on PPD#3 if continues to meet milestones. Plan for follow up in 5-7 days for BP check and 4-6 weeks for postpartum visit with OB provider.    Seen and discussed with Dr.Winchester Em Baker MD PGY-2      Principal Problem:    Preeclampsia, third trimester ([facility])  Active Problems:    Diabetes mellitus type 1 (Multi)    Hyperlipidemia    Neuromuscular disease (Multi)    Hypothyroidism    Pregnancy Problems (from 24 to present)       Problem Noted Resolved    Preeclampsia,  third trimester ([facility]) 7/3/2024 by Ana Lilia Lopez MD No    Priority:  Medium            Hospital course:   Preeclampsia with severe features  Vaginal Birth   The patient's blood type is O POS. The baby's blood type is B POS . Rhogam is not indicated.    Subjective   Her pain is well controlled with current medications  She is tolerating a Adult diet Regular  She reports no breast or nursing problems  She denies emotional concerns today   Her plan for contraception is POPs as bridge to nuvaring     Patient does not report headaches, visual changes, chest pain, shortness of breath or RUQ pain      Objective   Allergies:   Shellfish derived         Last Vitals:  Temp Pulse Resp BP MAP Pulse Ox   36.8 °C (98.2 °F) 83 20 (!) 157/92   97 %     Vitals Min/Max Last 24 Hours:  Temp  Min: 36.2 °C (97.2 °F)  Max: 37 °C (98.6 °F)  Pulse  Min: 69  Max: 106  Resp  Min: 16  Max: 20  BP  Min: 115/67  Max: 164/95    Intake/Output:     Intake/Output Summary (Last 24 hours) at 7/5/2024 0834  Last data filed at 7/5/2024 0739  Gross per 24 hour   Intake 5427.82 ml   Output 2587 ml   Net 2840.82 ml       Physical Exam:  General: well appearing, well-nourished  Obstetric: abdomen soft/non-tender, fundus firm below umbilicus, lochia light.   Skin: No rashes/lesions/erythema  Respiratory: no excessive respiratory effort. Lungs clear to auscultation bilaterally  Cardiovascular: warm and well perfused. Regular rate and rhythm   Neuro: A/Ox3, conversational; DTRs 2+  Psych: appropriate mood and affect      Lab Data:  Labs in chart were reviewed.

## 2024-07-05 NOTE — PROGRESS NOTES
Postpartum Progress Note    Assessment/Plan   Estefani Alvarenga is a 29 y.o., , who delivered at 35w2d gestation and is now postpartum day 0 from a . She has preeclampsia with severe features now on 24 hours of postpartum Mg. She also has T1DM    s/p  PPD#0  - Pain well controlled with PO pain meds  - Tolerating regular diet with anti-emetics PRN and bowel regimen ordered  - Glover in place 2/2 Mag  - Admission hgb 12.5 --> ; Continue to monitor for si/sx of anemia.   - Continue routine postpartum care     Preeclampsia with severe features  - P:C 1.38, diagnosed by severe range Bps requiring IV tx  - Last tx Labetalol 1730 7/3; hydral 5 0800 ; labetalol 20  0900  - intermittent headache, resolves with pain meds  - Nifed 60 (7/3) --> 90  > Nifed 60mg BID + enalapril 10mg  AM  - PEC labs nl x2  - Mg gtt until 003    T1DM  - s/p insulin gtt intrapartum  - Has CGM, overnight switched back to pump to most recent pregnancy settings  - Auto mode for basal rate  - Bolus carb ratios below:   12:00 AM (6 hr) 18 g/Unit  6:00 AM (11 hr) 10  g/Unit   5:00 PM (7 hr) 12g/Unit  - Currently euglycemic    Maternal well-being  - Hypothyroidism, continue synthroid    Feeding - breast/formula  - Continue to encourage breast feeding  - Lactation consult as needed    Postpartum Birth control  - Plans for POPs as bridge to nuvaring    DVT prophylaxis  - VTE risk score = 3  - SCDs  - Ambulation    Dispo: anticipate discharge on PPD#3 if continues to meet milestones. Plan for follow up in 5-7 days for BP check and 4-6 weeks for postpartum visit with OB provider.    Seen and discussed with Dr. Adam.  Sherri Slater MD PGY-2      Principal Problem:    Preeclampsia, third trimester ([facility])  Active Problems:    Diabetes mellitus type 1 (Multi)    Hyperlipidemia    Neuromuscular disease (Multi)    Hypothyroidism    Pre-eclampsia, third trimester ([facility])    Pregnancy Problems (from 24 to present)        Problem Noted Resolved    Preeclampsia, third trimester ([facility]) 7/3/2024 by Ana Lilia Lopez MD No    Priority:  Medium            Subjective   Seen at bedside in NAD. Denies HA/vision changes, CP/SOB, RUQ pain.      Objective   Allergies:   Shellfish derived         Last Vitals:  Temp Pulse Resp BP MAP Pulse Ox   36.9 °C (98.4 °F) 88 18 106/62   (!) 94 %     Vitals Min/Max Last 24 Hours:  Temp  Min: 36.2 °C (97.2 °F)  Max: 36.9 °C (98.4 °F)  Pulse  Min: 67  Max: 106  Resp  Min: 16  Max: 20  BP  Min: 99/59  Max: 169/97    Intake/Output:     Intake/Output Summary (Last 24 hours) at 7/5/2024 1808  Last data filed at 7/5/2024 1724  Gross per 24 hour   Intake 4978.94 ml   Output 3337 ml   Net 1641.94 ml       Physical Exam:  General: well appearing, well-nourished  Obstetric: abdomen soft/non-tender, fundus firm below umbilicus, lochia light.   Skin: No rashes/lesions/erythema  Respiratory: no excessive respiratory effort. Lungs clear to auscultation bilaterally  Cardiovascular: warm and well perfused. Regular rate and rhythm   Neuro: A/Ox3, conversational; DTRs 2+  Psych: appropriate mood and affect    Lab Data:  Labs in chart were reviewed.

## 2024-07-06 LAB
GLUCOSE BLD MANUAL STRIP-MCNC: 121 MG/DL (ref 74–99)
GLUCOSE BLD MANUAL STRIP-MCNC: 121 MG/DL (ref 74–99)
GLUCOSE BLD MANUAL STRIP-MCNC: 133 MG/DL (ref 74–99)
GLUCOSE BLD MANUAL STRIP-MCNC: 133 MG/DL (ref 74–99)
GLUCOSE BLD MANUAL STRIP-MCNC: 136 MG/DL (ref 74–99)
GLUCOSE BLD MANUAL STRIP-MCNC: 136 MG/DL (ref 74–99)
GLUCOSE BLD MANUAL STRIP-MCNC: 181 MG/DL (ref 74–99)
GLUCOSE BLD MANUAL STRIP-MCNC: 181 MG/DL (ref 74–99)

## 2024-07-06 PROCEDURE — 2500000004 HC RX 250 GENERAL PHARMACY W/ HCPCS (ALT 636 FOR OP/ED): Performed by: STUDENT IN AN ORGANIZED HEALTH CARE EDUCATION/TRAINING PROGRAM

## 2024-07-06 PROCEDURE — 1210000001 HC SEMI-PRIVATE ROOM DAILY

## 2024-07-06 PROCEDURE — 2500000001 HC RX 250 WO HCPCS SELF ADMINISTERED DRUGS (ALT 637 FOR MEDICARE OP)

## 2024-07-06 PROCEDURE — 2500000001 HC RX 250 WO HCPCS SELF ADMINISTERED DRUGS (ALT 637 FOR MEDICARE OP): Performed by: STUDENT IN AN ORGANIZED HEALTH CARE EDUCATION/TRAINING PROGRAM

## 2024-07-06 PROCEDURE — 2500000002 HC RX 250 W HCPCS SELF ADMINISTERED DRUGS (ALT 637 FOR MEDICARE OP, ALT 636 FOR OP/ED): Performed by: STUDENT IN AN ORGANIZED HEALTH CARE EDUCATION/TRAINING PROGRAM

## 2024-07-06 PROCEDURE — 82947 ASSAY GLUCOSE BLOOD QUANT: CPT

## 2024-07-06 RX ORDER — METOCLOPRAMIDE 10 MG/1
10 TABLET ORAL EVERY 6 HOURS PRN
Status: DISCONTINUED | OUTPATIENT
Start: 2024-07-06 | End: 2024-07-09 | Stop reason: HOSPADM

## 2024-07-06 RX ORDER — METOCLOPRAMIDE HYDROCHLORIDE 5 MG/ML
10 INJECTION INTRAMUSCULAR; INTRAVENOUS EVERY 6 HOURS PRN
Status: DISCONTINUED | OUTPATIENT
Start: 2024-07-06 | End: 2024-07-09 | Stop reason: HOSPADM

## 2024-07-06 RX ORDER — ONDANSETRON HYDROCHLORIDE 2 MG/ML
4 INJECTION, SOLUTION INTRAVENOUS EVERY 6 HOURS PRN
Status: DISCONTINUED | OUTPATIENT
Start: 2024-07-06 | End: 2024-07-09 | Stop reason: HOSPADM

## 2024-07-06 RX ORDER — ONDANSETRON 4 MG/1
4 TABLET, FILM COATED ORAL EVERY 6 HOURS PRN
Status: DISCONTINUED | OUTPATIENT
Start: 2024-07-06 | End: 2024-07-09 | Stop reason: HOSPADM

## 2024-07-06 ASSESSMENT — PAIN DESCRIPTION - DESCRIPTORS
DESCRIPTORS: ACHING
DESCRIPTORS: CRAMPING
DESCRIPTORS: OTHER (COMMENT)
DESCRIPTORS: ACHING;CRAMPING

## 2024-07-06 ASSESSMENT — PAIN SCALES - GENERAL
PAINLEVEL_OUTOF10: 5 - MODERATE PAIN
PAINLEVEL_OUTOF10: 3
PAINLEVEL_OUTOF10: 0 - NO PAIN
PAINLEVEL_OUTOF10: 3
PAINLEVEL_OUTOF10: 5 - MODERATE PAIN
PAINLEVEL_OUTOF10: 3
PAINLEVEL_OUTOF10: 2

## 2024-07-06 NOTE — CARE PLAN
The patient's goals for the shift include to get some sleep    The clinical goals for the shift include for patient's BP's & vitals to remain stable & for patient to get some rest    Patient did well overnight since transfer from [facility] 2 and was finally able to get some good sleep. BP's stable with no s/s PEC noted. Vitals stable otherwise and assessment unremarkable. Patient has not complained of any pain. Awaiting fasting BS this morning. Will continue to monitor for any changes.

## 2024-07-06 NOTE — LACTATION NOTE
Lactation Consultant Note  Lactation Consultation       Maternal Information       Maternal Assessment       Infant Assessment       Feeding Assessment       LATCH TOOL       Breast Pump       Other OB Lactation Tools       Patient Follow-up       Other OB Lactation Documentation       Recommendations/Summary  Mother not in room at this time. Letter left at the bedside.

## 2024-07-06 NOTE — ANESTHESIA POSTPROCEDURE EVALUATION
Patient: Estefani Alvarenga    Procedure Summary       Date: 24 Room / Location:     Anesthesia Start:  Anesthesia Stop: 24    Procedure: Labor Analgesia Diagnosis:     Scheduled Providers:  Responsible Provider: Dimitry Devine DO    Anesthesia Type: epidural ASA Status: 2          Anesthesia Type: epidural    Estefani Alvarenga is a 29 y.o., , who had a Vaginal, Spontaneous  delivery on 2024  at 35w2d and is now POD1.    She had Neuraxial Anesthesia without immediate complications noted.       Pain well controlled    Vitals:    24 1040   BP: 133/83   Pulse: 87   Resp: 18   Temp: 36.5 °C (97.7 °F)   SpO2: 99%     Neuraxial site assessed. No visible redness or swelling or drainage. Patient able to ambulate and move all extremities without difficulty. Able to void. No complaints of nausea/vomiting. Tolerating PO intake well. No s/sx of PDPH.    Anesthesia will sign off     Jennifer Rai MD      No notable events documented.

## 2024-07-06 NOTE — CARE PLAN
VSS t/o shift, no s/sx spec. Fundus and bleeding WDL. Pt ambulating, voiding, passing flatus, pumping, and visits NICU.        Problem: Pain - Adult  Goal: Verbalizes/displays adequate comfort level or baseline comfort level  Outcome: Progressing     Problem: Postpartum  Goal: Experiences normal postpartum course  Outcome: Progressing  Goal: Appropriate maternal -  bonding  Outcome: Progressing  Goal: Establish and maintain infant feeding pattern for adequate nutrition  Outcome: Progressing  Goal: Incisions, wounds, or drain sites healing without S/S of infection  Outcome: Progressing  Goal: No s/sx infection  Outcome: Progressing  Goal: No s/sx of hemorrhage  Outcome: Progressing  Goal: Minimal s/sx of HDP and BP<160/110  Outcome: Adequate for Discharge     Problem: Hypertensive Disorder of Pregnancy (HDP)  Goal: Minimal s/sx of HDP and BP<160/110  Outcome: Progressing  Goal: Adequate urine output (0.5 ml/kg/hr)  Outcome: Progressing     Problem: Safety - Adult  Goal: Free from fall injury  Outcome: Progressing     Problem: Chronic Conditions and Co-morbidities  Goal: Patient's chronic conditions and co-morbidity symptoms are monitored and maintained or improved  Outcome: Progressing

## 2024-07-06 NOTE — PROGRESS NOTES
Postpartum Progress Note    Assessment/Plan   Estefani Alvarenga is a 29 y.o., , who delivered at 35w2d gestation and is now postpartum day 1 from a . She has preeclampsia with severe features s/p 24 hrs pp Mg. She also has T1DM    s/p  PPD#1  -Pain well controlled with PO pain meds  -Afebrile, ambulating  -Tolerating regular diet with anti-emetics PRN and bowel regimen ordered  -voiding freely  -Admission hgb 12.5 --> ; Continue to monitor for si/sx of anemia.   -Continue routine postpartum care     Preeclampsia with severe features  - P:C 1.38, diagnosed by severe range Bps requiring IV tx  - Last tx Labetalol 1730 7/3; hydral 5 0800 ; labetalol 20  0900  - intermittent headache, resolves with pain meds  - Nifed 60 (7/3) --> 90 / > Nifed 60mg BID + enalapril 10mg  AM  - PEC labs nl x2  - s/p 24 hrs Mg postpartum   - normotensive    T1DM  - s/p insulin gtt intrapartum  - Has CGM, yesterday set pump setting to half of most recent pregnancy settings  - Auto mode for basal rate  - Bolus carb rations below:   12:00 AM (6 hr) 18 g/Unit  6:00 AM (11 hr) 10  g/Unit   5:00 PM (7 hr) 12g/Unit  - currently euglycemic    Maternal well-being  -hypothyroidism, continue synthroid    Feeding - breast  -Continue to encourage breast feeding  -Lactation consult as needed    Postpartum Birth control  -Plans for POPs as bridge to nuvaring    DVT prophylaxis  -VTE risk score = 3  -SCDs  -Ambulation    Dispo: anticipate discharge on PPD#3 if continues to meet milestones. Plan for follow up in 5-7 days for BP check and 4-6 weeks for postpartum visit with OB provider.    Seen and discussed with Dr.Winchester Em Baker MD PGY-2      Principal Problem:    Preeclampsia, third trimester ([facility])  Active Problems:    Diabetes mellitus type 1 (Multi)    Hyperlipidemia    Neuromuscular disease (Multi)    Hypothyroidism    Pre-eclampsia, third trimester ([facility])    Pregnancy Problems (from 24 to present)        Problem Noted Resolved    Preeclampsia, third trimester ([facility]) 7/3/2024 by Ana Lilia Lopez MD No    Priority:  Medium            Hospital course:   Preeclampsia with severe features  Vaginal Birth  Patient is currently breastfeedingThe patient's blood type is O POS. The baby's blood type is B POS . Rhogam is not indicated.    Subjective   Her pain is well controlled with current medications  She is tolerating a Adult diet Regular  She reports no breast or nursing problems  She denies emotional concerns today   Her plan for contraception is POPs as bridge to nuvaring    Feeling better that she was able to see her baby in NICU overnight    Patient does not report headaches, visual changes, chest pain, shortness of breath or RUQ pain      Objective   Allergies:   Shellfish derived         Last Vitals:  Temp Pulse Resp BP MAP Pulse Ox   36.8 °C (98.2 °F) 87 18 116/74   98 %     Vitals Min/Max Last 24 Hours:  Temp  Min: 36.1 °C (97 °F)  Max: 36.9 °C (98.4 °F)  Pulse  Min: 63  Max: 114  Resp  Min: 16  Max: 20  BP  Min: 99/59  Max: 169/97    Intake/Output:     Intake/Output Summary (Last 24 hours) at 7/6/2024 0646  Last data filed at 7/6/2024 0520  Gross per 24 hour   Intake 1984.07 ml   Output 3600 ml   Net -1615.93 ml       Physical Exam:  General: well appearing, well-nourished  Obstetric: abdomen soft/non-tender, fundus firm below umbilicus, lochia light.   Skin: No rashes/lesions/erythema  Respiratory: no excessive respiratory effort.   Cardiovascular: warm and well perfused. Regular rate and rhythm   Neuro: A/Ox3, conversational  Psych: appropriate mood and affect      Lab Data:  Labs in chart were reviewed.

## 2024-07-07 ENCOUNTER — DOCUMENTATION (OUTPATIENT)
Dept: OBSTETRICS AND GYNECOLOGY | Facility: HOSPITAL | Age: 29
End: 2024-07-07
Payer: COMMERCIAL

## 2024-07-07 VITALS
RESPIRATION RATE: 18 BRPM | TEMPERATURE: 97.7 F | OXYGEN SATURATION: 98 % | DIASTOLIC BLOOD PRESSURE: 81 MMHG | SYSTOLIC BLOOD PRESSURE: 118 MMHG | HEART RATE: 93 BPM

## 2024-07-07 LAB
GLUCOSE BLD MANUAL STRIP-MCNC: 142 MG/DL (ref 74–99)
GLUCOSE BLD MANUAL STRIP-MCNC: 142 MG/DL (ref 74–99)
GLUCOSE BLD MANUAL STRIP-MCNC: 157 MG/DL (ref 74–99)
GLUCOSE BLD MANUAL STRIP-MCNC: 157 MG/DL (ref 74–99)
GLUCOSE BLD MANUAL STRIP-MCNC: 49 MG/DL (ref 74–99)
GLUCOSE BLD MANUAL STRIP-MCNC: 49 MG/DL (ref 74–99)
GLUCOSE BLD MANUAL STRIP-MCNC: 53 MG/DL (ref 74–99)
GLUCOSE BLD MANUAL STRIP-MCNC: 53 MG/DL (ref 74–99)
GLUCOSE BLD MANUAL STRIP-MCNC: 54 MG/DL (ref 74–99)
GLUCOSE BLD MANUAL STRIP-MCNC: 54 MG/DL (ref 74–99)
GLUCOSE BLD MANUAL STRIP-MCNC: 85 MG/DL (ref 74–99)
GLUCOSE BLD MANUAL STRIP-MCNC: 85 MG/DL (ref 74–99)
GLUCOSE BLD MANUAL STRIP-MCNC: 86 MG/DL (ref 74–99)
GLUCOSE BLD MANUAL STRIP-MCNC: 86 MG/DL (ref 74–99)
GLUCOSE BLD MANUAL STRIP-MCNC: 88 MG/DL (ref 74–99)
GLUCOSE BLD MANUAL STRIP-MCNC: 88 MG/DL (ref 74–99)

## 2024-07-07 PROCEDURE — 2500000002 HC RX 250 W HCPCS SELF ADMINISTERED DRUGS (ALT 637 FOR MEDICARE OP, ALT 636 FOR OP/ED): Performed by: STUDENT IN AN ORGANIZED HEALTH CARE EDUCATION/TRAINING PROGRAM

## 2024-07-07 PROCEDURE — 2500000004 HC RX 250 GENERAL PHARMACY W/ HCPCS (ALT 636 FOR OP/ED): Performed by: STUDENT IN AN ORGANIZED HEALTH CARE EDUCATION/TRAINING PROGRAM

## 2024-07-07 PROCEDURE — 2500000005 HC RX 250 GENERAL PHARMACY W/O HCPCS: Performed by: STUDENT IN AN ORGANIZED HEALTH CARE EDUCATION/TRAINING PROGRAM

## 2024-07-07 PROCEDURE — 82947 ASSAY GLUCOSE BLOOD QUANT: CPT

## 2024-07-07 PROCEDURE — 1210000001 HC SEMI-PRIVATE ROOM DAILY

## 2024-07-07 PROCEDURE — 2500000001 HC RX 250 WO HCPCS SELF ADMINISTERED DRUGS (ALT 637 FOR MEDICARE OP)

## 2024-07-07 PROCEDURE — 2500000001 HC RX 250 WO HCPCS SELF ADMINISTERED DRUGS (ALT 637 FOR MEDICARE OP): Performed by: STUDENT IN AN ORGANIZED HEALTH CARE EDUCATION/TRAINING PROGRAM

## 2024-07-07 ASSESSMENT — PAIN DESCRIPTION - DESCRIPTORS
DESCRIPTORS: ACHING
DESCRIPTORS: ACHING
DESCRIPTORS: CRAMPING
DESCRIPTORS: ACHING
DESCRIPTORS: CRAMPING
DESCRIPTORS: ACHING;CRAMPING

## 2024-07-07 ASSESSMENT — PAIN SCALES - GENERAL
PAINLEVEL_OUTOF10: 4
PAINLEVEL_OUTOF10: 5 - MODERATE PAIN
PAINLEVEL_OUTOF10: 4
PAINLEVEL_OUTOF10: 5 - MODERATE PAIN
PAINLEVEL_OUTOF10: 6
PAINLEVEL_OUTOF10: 4
PAINLEVEL_OUTOF10: 2
PAINLEVEL_OUTOF10: 3
PAINLEVEL_OUTOF10: 4

## 2024-07-07 ASSESSMENT — PAIN DESCRIPTION - LOCATION
LOCATION: ABDOMEN

## 2024-07-07 NOTE — DISCHARGE INSTR - AVS FIRST PAGE
Any woman can have complications after a birth including a blood clot, a heart problem, hypertensive disorder/eclampsia, depression, hemorrhage, or infection. Notify all providers of your delivery date up to one year after birth.*       Call 911 or go to nearest emergency room right away if you have:   PAIN or pressure in chest;   OBSTRUCTED breathing or shortness of breath;   SEIZURES;   THOUGHTS of hurting yourself or someone else; heart palpitations/racing; change in alertness/confusion.    Call your provider if you have:   BLEEDING, soaking through a pad/hour, or blood clots the size of an egg or bigger;   INCISION (episiotomy stitches or  site) that is not healing (increased redness, pain, drainage/pus, or separation) if you had one;   RED or swollen leg/calf that is painful or warm to touch, especially in one leg more than the other;   TEMPERATURE of 100.4 F or higher or chills;   HEADACHE that does not get better with medicine, rest or hydration, or bad headache with vision changes   like spots or flashing lights; increased swelling of face, hands or legs; severe cramps or upper right belly pain; red or swollen breast that is painful or warm to touch; an unusual, foul odor from your vaginal discharge; pain, burning, or difficulty during urination; severe constipation (more than 5 days); feelings of depression (such as depressed mood, loss of interest in enjoyable things, unable to care for yourself, trouble sleeping, lack of appetite, or feeling worthless).     If you can't reach your provider or symptoms worsen, call 911 or go to nearest emergency room.   *Information obtained from [employer]: Save Your Life: Get Care for These POST-BIRTH Warning Signs      Please check your blood pressure twice a day; when you're resting, sitting upright, feet flat on the floor, in left arm resting on a table. If the top number is greater than 160 and/or the bottom number is greater than 110, call your OB provider  immediately or come to the nearest emergency room. If your blood pressure is between 150/100 and 160/110, rest for 1 hour and then recheck you blood pressure. If on recheck it is still between 150/100 and 160/110, call your OB provider or go to the nearest emergency room.

## 2024-07-07 NOTE — PROGRESS NOTES
Postpartum Progress Note    Assessment/Plan   Estefani Alvarenga is a 29 y.o., , who delivered at 35w2d gestation and is now postpartum day 2 from a . She has preeclampsia with severe features s/p 24 hrs pp Mg. She also has T1DM     s/p  PPD#2  -Pain well controlled with PO pain meds  -Afebrile, ambulating  -Tolerating regular diet with anti-emetics PRN and bowel regimen ordered  -voiding freely  -Admission hgb 12.5 --> ; Continue to monitor for si/sx of anemia.   -Continue routine postpartum care      Preeclampsia with severe features  - P:C 1.38, diagnosed by severe range Bps requiring IV tx  - Last tx Labetalol 1730 7/3; hydral 5 0800 ; labetalol 20  0900  - intermittent headache, resolves with pain meds  - Nifed 60 (7/3) --> 90 / > Nifed 60mg BID + enalapril 10mg  AM  - PEC labs nl x2  - s/p 24 hrs Mg postpartum   - normotensive     T1DM  - s/p insulin gtt intrapartum  - Has CGM, yesterday set pump setting to half of most recent pregnancy settings  - Auto mode for basal rate  - Bolus carb rations below:   12:00 AM (6 hr) 18 g/Unit  6:00 AM (11 hr) 10  g/Unit   5:00 PM (7 hr) 15g/Unit  - low BG overnight - symptomatic, resolved with juice. Fasting BG 49. Updates to pump settings as above.     Maternal well-being  -hypothyroidism, continue synthroid     Feeding - breast  -Continue to encourage breast feeding  -Lactation consult as needed     Postpartum Birth control  -Plans for POPs as bridge to nuvaring     DVT prophylaxis  -VTE risk score = 3  -SCDs  -Ambulation     Dispo: anticipate discharge on PPD#3 if continues to meet milestones. Plan for follow up in 5-7 days for BP check and 4-6 weeks for postpartum visit with OB provider.     Seen and discussed with Dr.Winchester Em Baker MD PGY-2    Principal Problem:    Preeclampsia, third trimester ([facility])  Active Problems:    Diabetes mellitus type 1 (Multi)    Hyperlipidemia    Neuromuscular disease (Multi)    Hypothyroidism     Pre-eclampsia, third trimester ([facility])    Pregnancy Problems (from 07/03/24 to present)       Problem Noted Resolved    Pre-eclampsia, third trimester ([facility]) 7/5/2024 by Cynthia Jennings RN No    Priority:  Medium      Preeclampsia, third trimester ([facility]) 7/3/2024 by Ana Lilia Lopez MD No    Priority:  Medium            Hospital course:   Preeclampsia with severe features  Vaginal Birth  Patient is currently breastfeedingThe patient's blood type is O POS. The baby's blood type is B POS . Rhogam is not indicated.    Subjective   Her pain is well controlled with current medications  She is ambulating well  She is tolerating a Adult diet Regular  She reports no breast or nursing problems  She denies emotional concerns today   Her plan for contraception is POPs as bridge to nuvaring     Doing well with breastfeeding baby in the NICU.    Patient does not report headaches, visual changes, chest pain, shortness of breath or RUQ pain      Objective   Allergies:   Shellfish derived         Last Vitals:  Temp Pulse Resp BP MAP Pulse Ox   36.6 °C (97.9 °F) 93 16 106/72   96 %     Vitals Min/Max Last 24 Hours:  Temp  Min: 35.7 °C (96.3 °F)  Max: 36.6 °C (97.9 °F)  Pulse  Min: 85  Max: 94  Resp  Min: 16  Max: 18  BP  Min: 104/67  Max: 133/83    Intake/Output:   No intake or output data in the 24 hours ending 07/07/24 0650    Physical Exam:  General: well appearing, well-nourished  Obstetric: abdomen soft/non-tender, fundus firm below umbilicus, lochia light.   Skin: No rashes/lesions/erythema  Respiratory: no excessive respiratory effort.   Cardiovascular: warm and well perfused. Regular rate and rhythm   Neuro: A/Ox3, conversational  Psych: appropriate mood and affect    Lab Data:  Labs in chart were reviewed.

## 2024-07-07 NOTE — CARE PLAN
The patient's goals for the shift include spend time with family visit    The clinical goals for the shift include pain control    Over the shift, the patient had family time at the NICU. Baby is feeding from both breast. Patient ambulates to NICU. Meeting postpartum milestones.     Problem: Pain - Adult  Goal: Verbalizes/displays adequate comfort level or baseline comfort level  Outcome: Progressing     Problem: Postpartum  Goal: Experiences normal postpartum course  Outcome: Progressing  Goal: Appropriate maternal -  bonding  Outcome: Progressing  Goal: Establish and maintain infant feeding pattern for adequate nutrition  Outcome: Progressing  Goal: Incisions, wounds, or drain sites healing without S/S of infection  Outcome: Progressing  Goal: No s/sx infection  Outcome: Progressing  Goal: No s/sx of hemorrhage  Outcome: Progressing     Problem: Hypertensive Disorder of Pregnancy (HDP)  Goal: Minimal s/sx of HDP and BP<160/110  Outcome: Progressing  Goal: Adequate urine output (0.5 ml/kg/hr)  Outcome: Progressing     Problem: Safety - Adult  Goal: Free from fall injury  Outcome: Progressing     Problem: Discharge Planning  Goal: Discharge to home or other facility with appropriate resources  Outcome: Progressing     Problem: Chronic Conditions and Co-morbidities  Goal: Patient's chronic conditions and co-morbidity symptoms are monitored and maintained or improved  Outcome: Progressing     Problem: Pain  Goal: Takes deep breaths with improved pain control throughout the shift  Outcome: Progressing  Goal: Turns in bed with improved pain control throughout the shift  Outcome: Progressing  Goal: Walks with improved pain control throughout the shift  Outcome: Progressing  Goal: Performs ADL's with improved pain control throughout shift  Outcome: Progressing  Goal: Participates in PT with improved pain control throughout the shift  Outcome: Progressing  Goal: Free from opioid side effects throughout the  shift  Outcome: Progressing  Goal: Free from acute confusion related to pain meds throughout the shift  Outcome: Progressing

## 2024-07-08 LAB
GLUCOSE BLD MANUAL STRIP-MCNC: 108 MG/DL (ref 74–99)
GLUCOSE BLD MANUAL STRIP-MCNC: 112 MG/DL (ref 74–99)
GLUCOSE BLD MANUAL STRIP-MCNC: 151 MG/DL (ref 74–99)
GLUCOSE BLD MANUAL STRIP-MCNC: 152 MG/DL (ref 74–99)
GLUCOSE BLD MANUAL STRIP-MCNC: 67 MG/DL (ref 74–99)
GLUCOSE BLD MANUAL STRIP-MCNC: 96 MG/DL (ref 74–99)

## 2024-07-08 PROCEDURE — 1210000001 HC SEMI-PRIVATE ROOM DAILY

## 2024-07-08 PROCEDURE — 2500000001 HC RX 250 WO HCPCS SELF ADMINISTERED DRUGS (ALT 637 FOR MEDICARE OP)

## 2024-07-08 PROCEDURE — 2500000004 HC RX 250 GENERAL PHARMACY W/ HCPCS (ALT 636 FOR OP/ED): Performed by: STUDENT IN AN ORGANIZED HEALTH CARE EDUCATION/TRAINING PROGRAM

## 2024-07-08 PROCEDURE — 2500000001 HC RX 250 WO HCPCS SELF ADMINISTERED DRUGS (ALT 637 FOR MEDICARE OP): Performed by: STUDENT IN AN ORGANIZED HEALTH CARE EDUCATION/TRAINING PROGRAM

## 2024-07-08 PROCEDURE — 2500000002 HC RX 250 W HCPCS SELF ADMINISTERED DRUGS (ALT 637 FOR MEDICARE OP, ALT 636 FOR OP/ED): Performed by: STUDENT IN AN ORGANIZED HEALTH CARE EDUCATION/TRAINING PROGRAM

## 2024-07-08 PROCEDURE — 82947 ASSAY GLUCOSE BLOOD QUANT: CPT

## 2024-07-08 PROCEDURE — 99233 SBSQ HOSP IP/OBS HIGH 50: CPT

## 2024-07-08 RX ORDER — ONDANSETRON 4 MG/1
4 TABLET, FILM COATED ORAL EVERY 6 HOURS PRN
Qty: 20 TABLET | Refills: 0 | Status: SHIPPED | OUTPATIENT
Start: 2024-07-08

## 2024-07-08 RX ORDER — ACETAMINOPHEN 325 MG/1
650 TABLET ORAL EVERY 6 HOURS PRN
Qty: 30 TABLET | Refills: 2 | Status: SHIPPED | OUTPATIENT
Start: 2024-07-08

## 2024-07-08 RX ORDER — ENALAPRIL MALEATE 10 MG/1
10 TABLET ORAL DAILY
Qty: 30 TABLET | Refills: 3 | Status: SHIPPED | OUTPATIENT
Start: 2024-07-09

## 2024-07-08 RX ORDER — ACETAMINOPHEN 500 MG
1 TABLET ORAL DAILY
Qty: 1 KIT | Refills: 0 | Status: SHIPPED | OUTPATIENT
Start: 2024-07-08

## 2024-07-08 RX ORDER — DOCUSATE SODIUM 100 MG/1
100 CAPSULE, LIQUID FILLED ORAL 2 TIMES DAILY PRN
Qty: 30 CAPSULE | Refills: 5 | Status: SHIPPED | OUTPATIENT
Start: 2024-07-08

## 2024-07-08 RX ORDER — IBUPROFEN 600 MG/1
600 TABLET ORAL EVERY 6 HOURS PRN
Qty: 30 TABLET | Refills: 2 | Status: SHIPPED | OUTPATIENT
Start: 2024-07-08

## 2024-07-08 RX ORDER — NIFEDIPINE 60 MG/1
60 TABLET, FILM COATED, EXTENDED RELEASE ORAL 2 TIMES DAILY
Qty: 30 TABLET | Refills: 3 | Status: SHIPPED | OUTPATIENT
Start: 2024-07-08

## 2024-07-08 RX ORDER — POLYETHYLENE GLYCOL 3350 17 G/17G
17 POWDER, FOR SOLUTION ORAL DAILY PRN
Qty: 30 PACKET | Refills: 2 | Status: SHIPPED | OUTPATIENT
Start: 2024-07-08

## 2024-07-08 RX ORDER — NORETHINDRONE 0.35 MG/1
1 TABLET ORAL DAILY
Qty: 30 TABLET | Refills: 12 | Status: SHIPPED | OUTPATIENT
Start: 2024-07-08

## 2024-07-08 ASSESSMENT — PAIN SCALES - GENERAL
PAINLEVEL_OUTOF10: 0 - NO PAIN
PAINLEVEL_OUTOF10: 5 - MODERATE PAIN
PAINLEVEL_OUTOF10: 0 - NO PAIN
PAINLEVEL_OUTOF10: 4
PAINLEVEL_OUTOF10: 4
PAINLEVEL_OUTOF10: 3

## 2024-07-08 ASSESSMENT — PAIN DESCRIPTION - LOCATION: LOCATION: ABDOMEN

## 2024-07-08 ASSESSMENT — PAIN DESCRIPTION - DESCRIPTORS
DESCRIPTORS: CRAMPING
DESCRIPTORS: CRAMPING
DESCRIPTORS: ACHING

## 2024-07-08 NOTE — PROGRESS NOTES
Postpartum Progress Note    Assessment/Plan   Estefani Alvarenga is a 29 y.o., , who delivered at 35w2d gestation and is now postpartum day 3 from a . She has preeclampsia with severe features s/p 24 hrs pp Mg. She also has T1DM.     s/p  PPD#3  -Pain well controlled with PO pain meds  -Afebrile, ambulating  -Tolerating regular diet with anti-emetics PRN and bowel regimen ordered  -voiding freely  -Admission hgb 12.5 --> ; Continue to monitor for si/sx of anemia.   -Continue routine postpartum care      Preeclampsia with severe features  - P:C 1.38, diagnosed by severe range Bps requiring IV tx  - Last tx Labetalol 1730 7/3; hydral 5 0800 ; labetalol 20  0900  - intermittent headache, resolves with pain meds. None currently   - Nifed 60 (7/3) --> 90  > Nifed 60mg BID + enalapril 10mg  AM  - PEC labs nl x2  - s/p 24 hrs Mg postpartum   - normotensive     T1DM  - s/p insulin gtt intrapartum  - Has CGM, yesterday set pump setting to half of most recent pregnancy settings  - Auto mode for basal rate:  Auto mode  12:00 AM (6 hr) 18 g/Unit  6:00 AM (11 hr) 10 g/Unit   5:00 PM (7 hr) 15 g/Unit  - Elevated BGL to 151 this AM. Patient to replace insulin inside pump then self-treat.     Maternal well-being  -hypothyroidism, continue synthroid     Feeding - breast  -Continue to encourage breast feeding  -Lactation consult as needed     Postpartum Birth control  -Plans for POPs as bridge to nuvaring     DVT prophylaxis  -VTE risk score = 3  -SCDs  -Ambulation     Dispo: Medically cleared for discharge. Desires to stay due to infant in NICU. Anticipate discharge today or tomorrow. Plan for follow up in 5-7 days for BP check and 4-6 weeks for postpartum visit with OB provider.     To be seen & dw/ MFM attending, Dr. Matthew Hoang MD  PGY-2, Obstetrics & Gynecology   [facility]     Principal Problem:    Preeclampsia, third trimester ([facility])  Active  Problems:    Diabetes mellitus type 1 (Multi)    Hyperlipidemia    Neuromuscular disease (Multi)    Hypothyroidism    Pre-eclampsia, third trimester ([facility])    Pregnancy Problems (from 07/03/24 to present)       Problem Noted Resolved    Pre-eclampsia, third trimester ([facility]) 7/5/2024 by Cynthia Jennings RN No    Priority:  Medium      Preeclampsia, third trimester ([facility]) 7/3/2024 by Ana Lilia Lopez MD No    Priority:  Medium            Hospital course:   Preeclampsia with severe features  Vaginal Birth  Patient is currently breastfeedingThe patient's blood type is O POS. The baby's blood type is B POS . Rhogam is not indicated.    Subjective   Her pain is well controlled with current medications  She is ambulating well  She is tolerating a Adult diet Regular  She reports no breast or nursing problems  She denies emotional concerns today   Her plan for contraception is POPs as bridge to nuvaring      Patient currently feels well. She denies HA, blurry vision, or RUQ pain. Had AM BGL of 151, not yet treated. States that she ran out of insulin in pump. Has replacement at bedside. Breast feeding/pumping for infant in NICU. States she is improving. Considering boarding vs staying another day.       Objective   Allergies:   Shellfish derived         Last Vitals:  Temp Pulse Resp BP MAP Pulse Ox   36.8 °C (98.2 °F) 88 18 128/79   98 %     Vitals Min/Max Last 24 Hours:  Temp  Min: 36 °C (96.8 °F)  Max: 36.8 °C (98.2 °F)  Pulse  Min: 88  Max: 105  Resp  Min: 18  Max: 18  BP  Min: 118/78  Max: 133/87    Intake/Output:   No intake or output data in the 24 hours ending 07/08/24 0656    Physical Exam:  General: well appearing, well-nourished  Obstetric: abdomen soft/non-tender, fundus firm below umbilicus, lochia light.   Skin: No rashes/lesions/erythema  Respiratory: no excessive respiratory effort.   Cardiovascular: warm and well perfused. Regular rate and rhythm   Neuro: A/Ox3, conversational  Psych: appropriate mood  and affect    Lab Data:  Labs in chart were reviewed.

## 2024-07-08 NOTE — CARE PLAN
The patient's goals for the shift include Board after discharge    The clinical goals for the shift include Continue to meet pp milestones as expected    Pt stable meeting pp milestones as expected. Abd cramps resolved after tylenol and ibuprofen this afternoon. Blood sugars have been stable and pt using her insulin pump as ordered. BP's stable and pt asymptomatic of s/sx of pec. Pt has been visiting her baby at the nicu and breasfeeding her. Plan for discharge tomorrow. Birth certificate completed and turned in.

## 2024-07-08 NOTE — SIGNIFICANT EVENT
BP Cuff and Home Monitoring  Patient meets criteria for home monitoring of blood pressure post discharge.  Reason: current preeclampsia with severe features,. Met with patient to assess for availability of home BP monitor.  Patient stated she owns home BP monitor. . Patient educated on importance of continuing to monitor BP at home, recording BP on home monitoring log and s/sx of when to call her provider.  Pt verbalized understanding the above information.

## 2024-07-08 NOTE — LACTATION NOTE
Lactation Consultant Note  Lactation Consultation       Maternal Information       Maternal Assessment       Infant Assessment       Feeding Assessment       LATCH TOOL       Breast Pump       Other OB Lactation Tools       Patient Follow-up       Other OB Lactation Documentation       Recommendations/Summary  Baby remains in the NICU at this time.  Mom was eating her lunch, therefore I did not view a pumping session.   Mom stated she is latching the baby to the breast at times and also pumping. She has no concerns with pumping; she is starting to obtain ML's of breast milk for the baby and is taking them to the NICU.  She stated the baby is latching and doing well according to Nurses. The baby may be transferred to NICU step down later today.  Parents ate excited abut how well breast feeding/ pumping is progressing.     Encouraged her to latch when she can and to ask NICU nurses / lactation for assistance with feeds,if needed.   Encouraged her to pump every 3 hours (8-12 times in a 24 hour period) for 20 minutes on both breasts and to take the pumped breast milk to the NICU for the baby.   Once the baby is feeding well at the breast consistently and her full milk supply is in; discussed if the baby latches well to the breast and is content after the feeding to not pump in addition to avoid over production. But, for now, latching/ pumping is okay d/t LPTI.     Mom stated she has a breast pump for at home.     Questions answered.     Encouraged her to utilize the outpatient lactation resources, if needed.   Contact information given.   [phone] Fabiana or [phone] Viviana

## 2024-07-08 NOTE — HOSPITAL COURSE
Estefani Alvarenga is a 30 y/o  with a PMH notable for hypothyroidism and T1DM who was admitted at 35w0d for IOL in the setting of superimposed preeclampsia with severe features diagnosed by severe range blood pressures requiring IV treatment. Patient with baseline history of cHTN, on no meds. On admission, she was started on Nifed 30mg and was rapidly up-titrated to Nifed 60mg BID + Enalapril 10mg. Patient had an uncomplicated  on 24; baby was sent to the NICU for poor respiratory effort. Estefani underwent Mag gtt while in labor and 24h postpartum. Blood glucose levels remained appropriately controlled on an insulin gtt in the intrapartum setting. Following delivery, she was transitioned back to her CGM and insulin pump. By POD#4, patient was meeting all postpartum milestones. Her BP and BGL were well-controlled. She was discharged home in stable condition with plans to follow-up in 5-7 days for a BP check and 4-6 weeks for a postpartum visit with OB provider.

## 2024-07-08 NOTE — LACTATION NOTE
This note was copied from a baby's chart.  .Lactation Consultant Note  Lactation Consultation       Maternal Information       Maternal Assessment       Infant Assessment       Feeding Assessment       LATCH TOOL       Breast Pump       Other OB Lactation Tools       Patient Follow-up       Other OB Lactation Documentation       Recommendations/Summary  Explained availability of Lactation Consult services. Reviewed listed patient instruction material, use of symphony electric breast pump and CDC pump cleaning and sanitizing guidelines. Mom encouraged to massage breast before and during pumping. Instructed in hand expression/massage technique. Mom encouraged to provide kangaroo care (skin-to-skin care). Discussed benefits.  Mom has a spectra pump for home use. She has had a few breastfeeds over the weekend that were 15-20 min. Mom states no pain with breastfeeding or pumping. Will observe mom breastfeeding with the next feed at noon.    Invited to contact Lactation Consult services as needed

## 2024-07-09 ENCOUNTER — APPOINTMENT (OUTPATIENT)
Dept: RADIOLOGY | Facility: CLINIC | Age: 29
End: 2024-07-09
Payer: COMMERCIAL

## 2024-07-09 ENCOUNTER — APPOINTMENT (OUTPATIENT)
Dept: MATERNAL FETAL MEDICINE | Facility: CLINIC | Age: 29
End: 2024-07-09
Payer: COMMERCIAL

## 2024-07-09 VITALS
RESPIRATION RATE: 18 BRPM | DIASTOLIC BLOOD PRESSURE: 93 MMHG | TEMPERATURE: 96.8 F | SYSTOLIC BLOOD PRESSURE: 136 MMHG | HEART RATE: 85 BPM | OXYGEN SATURATION: 98 %

## 2024-07-09 PROCEDURE — 2500000001 HC RX 250 WO HCPCS SELF ADMINISTERED DRUGS (ALT 637 FOR MEDICARE OP)

## 2024-07-09 PROCEDURE — 99238 HOSP IP/OBS DSCHRG MGMT 30/<: CPT

## 2024-07-09 PROCEDURE — 2500000002 HC RX 250 W HCPCS SELF ADMINISTERED DRUGS (ALT 637 FOR MEDICARE OP, ALT 636 FOR OP/ED): Performed by: STUDENT IN AN ORGANIZED HEALTH CARE EDUCATION/TRAINING PROGRAM

## 2024-07-09 PROCEDURE — 2500000001 HC RX 250 WO HCPCS SELF ADMINISTERED DRUGS (ALT 637 FOR MEDICARE OP): Performed by: STUDENT IN AN ORGANIZED HEALTH CARE EDUCATION/TRAINING PROGRAM

## 2024-07-09 ASSESSMENT — PAIN DESCRIPTION - DESCRIPTORS: DESCRIPTORS: CRAMPING

## 2024-07-09 ASSESSMENT — PAIN SCALES - GENERAL: PAINLEVEL_OUTOF10: 2

## 2024-07-09 NOTE — LACTATION NOTE
Lactation Consultant Note  Lactation Consultation  Reason for Consult: Follow-up assessment, Late  infant  Consultant Name: Kena Bravo RN, IBCLC    Maternal Information  Has mother  before?: No  Infant to breast within first 2 hours of birth?: No  Breastfeeding Delayed Due to: Infant status    Maternal Assessment       Infant Assessment  Infant Behavior:  (infant in NICU step down)    Feeding Assessment  Nutrition Source: Breastmilk  Feeding Method: Feeding expressed breastmilk    LATCH TOOL       Breast Pump  Pump: Hospital grade electric pump  Volume of Milk Production: 50  Units of Volume: mL per session    Other OB Lactation Tools       Patient Follow-up  Inpatient Lactation Follow-up Needed : Yes  Outpatient Lactation Follow-up: Recommended    Other OB Lactation Documentation  Maternal Risk Factors: Preeclampsia, Diabetes (gestational, types 1 or 2),  delivery <37 weeks  Infant Risk Factors: Prematurity <37 weeks    Recommendations/Summary  Mother walking out for discharge at this time. Mother states she pumped 50 ml this morning from both breasts. Reviewed typical milk supply pattern in the early postpartum period. Mother has a pump for home use, plans to stay with  and infant in step down NICU room until infant is discharged and continue to use hospital grade Medela pump. Mother aware of NICU and outpatient lactation support/resources.

## 2024-07-09 NOTE — PROGRESS NOTES
Postpartum Progress Note    Assessment/Plan   Estefani Alvarenga is a 29 y.o., , who delivered at 35w2d gestation and is now postpartum day 4 from a . She has preeclampsia with severe features s/p 24 hrs pp Mg. She also has T1DM.     s/p  PPD#3  -Pain well controlled with PO pain meds  -Afebrile, ambulating  -Tolerating regular diet with anti-emetics PRN and bowel regimen ordered  -voiding freely  -Admission hgb 12.5 --> ; Continue to monitor for si/sx of anemia.   -Continue routine postpartum care      Preeclampsia with severe features  - P:C 1.38, diagnosed by severe range Bps requiring IV tx  - Last tx Labetalol 1730 7/3; hydral 5 0800 ; labetalol 20  0900  - intermittent headache, resolves with pain meds. None currently   - Nifed 60 (7/3) --> 90  > Nifed 60mg BID + enalapril 10mg  AM. Normotensive overnight  - PEC labs nl x2  - s/p 24 hrs Mg postpartum   - normotensive     T1DM  - s/p insulin gtt intrapartum  - Has CGM, yesterday set pump setting to half of most recent pregnancy settings  - Auto mode for basal rate:  Auto mode  12:00 AM (6 hr) 18 g/Unit  6:00 AM (11 hr) 10 g/Unit   5:00 PM (7 hr) 15 g/Unit  - Patient previously needed to replace insulin into pump. Has since done that with euglycemic control. 111 this AM, asx.     Maternal well-being  -hypothyroidism, continue synthroid     Feeding - breast  -Continue to encourage breast feeding  -Lactation consult as needed     Postpartum Birth control  -Plans for POPs as bridge to nuvaring     DVT prophylaxis  -VTE risk score = 3  -SCDs  -Ambulation     Dispo: Medically cleared for discharge. Anticipate discharge today, desires to board if infant still admitted. Plan for follow up in 5-7 days for BP check and 4-6 weeks for postpartum visit with OB provider.     To be seen & dw/ MFM attending, Dr. Matthew Hoang MD  PGY-2, Obstetrics & Gynecology   [facility]     Principal Problem:     Preeclampsia, third trimester ([facility])  Active Problems:    Diabetes mellitus type 1 (Multi)    Hyperlipidemia    Neuromuscular disease (Multi)    Hypothyroidism    Pre-eclampsia, third trimester ([facility])    Pregnancy Problems (from 07/03/24 to present)       Problem Noted Resolved    Pre-eclampsia, third trimester ([facility]) 7/5/2024 by Cynthia Jennings RN No    Priority:  Medium      Preeclampsia, third trimester ([facility]) 7/3/2024 by Ana Lilia Lopez MD No    Priority:  Medium            Hospital course:   Preeclampsia with severe features  Vaginal Birth  Patient is currently breastfeedingThe patient's blood type is O POS. The baby's blood type is B POS . Rhogam is not indicated.    Subjective   Her pain is well controlled with current medications  She is ambulating well  She is tolerating a Adult diet Regular  She reports no breast or nursing problems  She denies emotional concerns today   Her plan for contraception is POPs as bridge to nuvaring      Patient currently feels well. She denies HA, blurry vision, or RUQ pain. Had AM BGL of 151, not yet treated. States that she ran out of insulin in pump. Has replacement at bedside. Breast feeding/pumping for infant in NICU. States she is improving. Considering boarding vs staying another day.       Objective   Allergies:   Shellfish derived         Last Vitals:  Temp Pulse Resp BP MAP Pulse Ox   36.3 °C (97.3 °F) 89 18 132/85   98 %     Vitals Min/Max Last 24 Hours:  Temp  Min: 35.9 °C (96.6 °F)  Max: 36.3 °C (97.3 °F)  Pulse  Min: 74  Max: 105  Resp  Min: 18  Max: 18  BP  Min: 132/85  Max: 144/89    Intake/Output:   No intake or output data in the 24 hours ending 07/09/24 0745    Physical Exam:  General: well appearing, well-nourished  Obstetric: abdomen soft/non-tender, fundus firm below umbilicus, lochia light.   Skin: No rashes/lesions/erythema  Respiratory: no excessive respiratory effort.   Cardiovascular: warm and well perfused. Regular rate and rhythm    Neuro: A/Ox3, conversational  Psych: appropriate mood and affect    Lab Data:  Labs in chart were reviewed.

## 2024-07-09 NOTE — CARE PLAN
The patient's goals for the shift include to get some sleep!    The clinical goals for the shift include for patient to have stable BP's and BS's overnight    Patient did well overnight and should be discharged home today. BP's stable with no s/s PEC noted. Vitals stable otherwise and patient has not complained of any pain. Assessment unremarkable. Patient is pumping and visits with baby often. Was able to get some good sleep overnight. Awaiting fasting BS, but team made aware that her CGM is still reading 15 points higher than our machine even after sensor was switched out yesterday. Will continue to monitor for any changes.

## 2024-07-09 NOTE — CARE PLAN
The patient's goals for the shift include TO GO HOME    The clinical goals for the shift include BP <160/110    Over the shift, the patient did make progress toward the following goals. Barriers to progression include none. Recommendations to address these barriers include continuing blood pressure meds and blood pressure checks twice a day..

## 2024-07-09 NOTE — DISCHARGE SUMMARY
Discharge Summary    Admission Date: 7/3/2024  Discharge Date: 2024    Discharge Diagnosis  Preeclampsia, third trimester ([facility])    Hospital Course  Estefani Alvarenga is a 28 y/o  with a PMH notable for hypothyroidism and T1DM who was admitted at 35w0d for IOL in the setting of superimposed preeclampsia with severe features diagnosed by severe range blood pressures requiring IV treatment. Patient with baseline history of cHTN, on no meds. On admission, she was started on Nifed 30mg and was rapidly up-titrated to Nifed 60mg BID + Enalapril 10mg. Patient had an uncomplicated  on 24; baby was sent to the NICU for poor respiratory effort. Estefani underwent Mag gtt while in labor and 24h postpartum. Blood glucose levels remained appropriately controlled on an insulin gtt in the intrapartum setting. Following delivery, she was transitioned back to her CGM and insulin pump. By POD#4, patient was meeting all postpartum milestones. Her BP and BGL were well-controlled. She was medically discharged, but remained in-house to board with infant in NICU with plans to follow-up in 7 days for a BP check and 4 weeks for a postpartum visit with OB provider.     Pertinent Physical Exam At Time of Discharge  General: no acute distress  HEENT: normocephalic, atraumatic  CV: warm and well perfused  Lungs: breathing comfortably on room air  Abdomen: NTND  Extremities: moving all extremities spontaneously  Neuro: awake and conversant  Psych: appropriate mood and affect      Discharge Meds     Your medication list        START taking these medications        Instructions Last Dose Given Next Dose Due   acetaminophen 325 mg tablet  Commonly known as: Tylenol      Take 2 tablets (650 mg) by mouth every 6 hours if needed for mild pain (1 - 3).       blood pressure monitor kit  Commonly known as: Blood Pressure Kit      1 Application once daily.       docusate sodium 100 mg capsule  Commonly known as: Colace      Take 1 capsule  (100 mg) by mouth 2 times a day as needed for constipation.       enalapril 10 mg tablet  Commonly known as: Vasotec      Take 1 tablet (10 mg) by mouth once daily.       ibuprofen 600 mg tablet      Take 1 tablet (600 mg) by mouth every 6 hours if needed for mild pain (1 - 3).       NIFEdipine ER 60 mg 24 hr tablet  Commonly known as: Adalat CC      Take 1 tablet (60 mg) by mouth 2 times a day. Do not crush, chew, or split.       norethindrone 0.35 mg tablet  Commonly known as: Micronor      Take 1 tablet (0.35 mg) over 28 days by mouth once daily.       ondansetron 4 mg tablet  Commonly known as: Zofran      Take 1 tablet (4 mg) by mouth every 6 hours if needed for nausea or vomiting.       polyethylene glycol 17 gram packet  Commonly known as: Glycolax, Miralax      Take 17 g by mouth once daily as needed (constipation).              CONTINUE taking these medications        Instructions Last Dose Given Next Dose Due   Dexcom G6 Transmitter device  Generic drug: blood-glucose transmitter device           DULoxetine 60 mg DR capsule  Commonly known as: Cymbalta           levothyroxine 50 mcg tablet  Commonly known as: Synthroid, Levoxyl           Lyumjev U-100 Insulin 100 unit/mL solution  Generic drug: insulin lispro-aabc           metoprolol tartrate 25 mg tablet  Commonly known as: Lopressor           Omnipod 5 G6 Intro Kit (Gen 5) cartridge  Generic drug: insulin pump cart,auto,BT-cntr           pregabalin 300 mg capsule  Commonly known as: Lyrica                  STOP taking these medications      aspirin 81 mg EC tablet                  Where to Get Your Medications        These medications were sent to [id number] - [place], [place] - [address] AT [facility]  [address] [facility] [address]      Phone: [phone]   acetaminophen 325 mg tablet  blood pressure monitor kit  docusate sodium 100 mg capsule  enalapril 10 mg tablet  ibuprofen 600 mg tablet  NIFEdipine ER 60 mg 24 hr  tablet  norethindrone 0.35 mg tablet  ondansetron 4 mg tablet  polyethylene glycol 17 gram packet          Complications Requiring Follow-Up  None    Test Results Pending At Discharge  Pending Labs       Order Current Status    Surgical Pathology Exam - PLACENTA In process            Outpatient Follow-Up  No future appointments.        Seen & evaluated with MFM attending, ALAN Phelps MD  PGY-2, Obstetrics & Gynecology   [facility]

## 2024-07-09 NOTE — DISCHARGE INSTR - ACTIVITY
No heavy lifting.Nothing over ten pounds.No exercising for  six weeks . No sitting in dirty water no tub baths, swimming pools, or jacuzzi

## 2024-07-10 LAB
LABORATORY COMMENT REPORT: NORMAL
PATH REPORT.ADDENDUM SPEC: NORMAL
PATH REPORT.FINAL DX SPEC: NORMAL
PATH REPORT.GROSS SPEC: NORMAL
PATH REPORT.RELEVANT HX SPEC: NORMAL
PATH REPORT.TOTAL CANCER: NORMAL

## 2024-07-11 ENCOUNTER — LACTATION ENCOUNTER (OUTPATIENT)
Dept: OTHER | Facility: HOSPITAL | Age: 29
End: 2024-07-11

## 2024-07-11 NOTE — LACTATION NOTE
This note was copied from a baby's chart.  Lactation Consultant Note  Lactation Consultation   Rosie Boateng, RN IBCLC      Recommendations/Summary       I spoke with parents at pt's bedside around the 1200 feeding.  Mom reports that pumping is going well.  The baby is nursing and feeing expressed milk in a bottle.  She is taking every feeding by mouth.  If all goes well she will be discharge tomorrow.  I will follow up with parents tomorrow and provide discharge instructions.

## 2024-07-12 ENCOUNTER — APPOINTMENT (OUTPATIENT)
Dept: OBSTETRICS AND GYNECOLOGY | Facility: CLINIC | Age: 29
End: 2024-07-12
Payer: COMMERCIAL

## 2024-07-12 ENCOUNTER — LACTATION ENCOUNTER (OUTPATIENT)
Dept: OTHER | Facility: HOSPITAL | Age: 29
End: 2024-07-12

## 2024-07-12 NOTE — LACTATION NOTE
This note was copied from a baby's chart.  Lactation Consultant Note  Lactation Consultation   Rosie Boateng RN IBCLC    Recommendations/Summary       Reviewed Breastfeeding discharge information: Electric Breast Pumps & Milk Storage for Your Healthy Baby, Breast-feeding: Tips to Increase Your Milk Supply, Is My Breast-fed Baby Getting Enough to Eat?, Nursing Your Baby,  Lactation Center Information, [facility] Breastfeeding & safe sleep information, [facility] Breastfeeding Hotline.       Your baby should nurse a minimum of 8-12 times in 24 hours (every 2-3 hours). Wake a sleepy baby every 3 hours to feed, even during the night. The more often you nurse, the more milk your body will produce. Burp your baby when switching sides and at the end of a feeding. Expect at least 1 wet diaper per day for the first 2 days, increasing to 6-8 diapers per day by day 7 of age.  Mom was encouraged to reach out for out pt LC support as needed.

## 2024-07-16 ENCOUNTER — APPOINTMENT (OUTPATIENT)
Dept: OBSTETRICS AND GYNECOLOGY | Facility: CLINIC | Age: 29
DRG: 776 | End: 2024-07-16
Payer: COMMERCIAL

## 2024-07-16 ENCOUNTER — CLINICAL SUPPORT (OUTPATIENT)
Dept: OBSTETRICS AND GYNECOLOGY | Facility: CLINIC | Age: 29
End: 2024-07-16
Payer: COMMERCIAL

## 2024-07-16 ENCOUNTER — APPOINTMENT (OUTPATIENT)
Dept: RADIOLOGY | Facility: CLINIC | Age: 29
End: 2024-07-16
Payer: COMMERCIAL

## 2024-07-16 ENCOUNTER — APPOINTMENT (OUTPATIENT)
Dept: MATERNAL FETAL MEDICINE | Facility: CLINIC | Age: 29
End: 2024-07-16
Payer: COMMERCIAL

## 2024-07-16 VITALS — HEART RATE: 73 BPM | DIASTOLIC BLOOD PRESSURE: 80 MMHG | SYSTOLIC BLOOD PRESSURE: 116 MMHG

## 2024-07-16 DIAGNOSIS — Z01.30 BP CHECK: ICD-10-CM

## 2024-07-16 NOTE — PROGRESS NOTES
Pt here for bp check post delivery on 7/5. Pt is doing well. Denies any headaches, blurred vision or epigastric pain. Pt states her blood sugars have been good. Pt states breastfeeding is going well. Pt denies any depression- just having a little anxiety. Pt already seeing a counselor and will schedule appointment. Pt states she is sleeping well. Pt reminded to come to post partum visit scheduled.

## 2024-07-26 DIAGNOSIS — M54.10 POLYRADICULOPATHY: ICD-10-CM

## 2024-07-29 RX ORDER — TOPIRAMATE 50 MG/1
TABLET, FILM COATED ORAL
Qty: 90 TABLET | Refills: 11 | Status: SHIPPED | OUTPATIENT
Start: 2024-07-29

## 2024-08-06 ENCOUNTER — TELEPHONE (OUTPATIENT)
Dept: PAIN MEDICINE | Facility: CLINIC | Age: 29
End: 2024-08-06
Payer: COMMERCIAL

## 2024-08-06 DIAGNOSIS — M54.10 POLYRADICULOPATHY: ICD-10-CM

## 2024-08-06 DIAGNOSIS — E10.44 DIABETIC AMYOTROPHY ASSOCIATED WITH TYPE 1 DIABETES MELLITUS (MULTI): ICD-10-CM

## 2024-08-06 RX ORDER — PREGABALIN 300 MG/1
300 CAPSULE ORAL 2 TIMES DAILY
Qty: 60 CAPSULE | Refills: 5 | Status: SHIPPED | OUTPATIENT
Start: 2024-08-06 | End: 2024-09-05

## 2024-08-13 ENCOUNTER — OFFICE VISIT (OUTPATIENT)
Dept: MATERNAL FETAL MEDICINE | Facility: CLINIC | Age: 29
End: 2024-08-13
Payer: COMMERCIAL

## 2024-08-13 ENCOUNTER — APPOINTMENT (OUTPATIENT)
Dept: CARDIOLOGY | Facility: HOSPITAL | Age: 29
End: 2024-08-13
Payer: COMMERCIAL

## 2024-08-13 ENCOUNTER — HOSPITAL ENCOUNTER (INPATIENT)
Facility: HOSPITAL | Age: 29
LOS: 1 days | Discharge: HOME | DRG: 776 | End: 2024-08-15
Attending: OBSTETRICS & GYNECOLOGY | Admitting: OBSTETRICS & GYNECOLOGY
Payer: COMMERCIAL

## 2024-08-13 ENCOUNTER — APPOINTMENT (OUTPATIENT)
Dept: RADIOLOGY | Facility: HOSPITAL | Age: 29
End: 2024-08-13
Payer: COMMERCIAL

## 2024-08-13 VITALS — HEART RATE: 139 BPM | DIASTOLIC BLOOD PRESSURE: 63 MMHG | SYSTOLIC BLOOD PRESSURE: 94 MMHG

## 2024-08-13 DIAGNOSIS — F41.8 POSTPARTUM ANXIETY (HHS-HCC): Primary | ICD-10-CM

## 2024-08-13 DIAGNOSIS — O14.13 SEVERE PRE-ECLAMPSIA IN THIRD TRIMESTER (HHS-HCC): ICD-10-CM

## 2024-08-13 DIAGNOSIS — R74.8 ELEVATED LIVER ENZYMES: ICD-10-CM

## 2024-08-13 DIAGNOSIS — E10.40 TYPE 1 DIABETES MELLITUS WITH DIABETIC NEUROPATHY (MULTI): ICD-10-CM

## 2024-08-13 DIAGNOSIS — O14.93 PREECLAMPSIA, THIRD TRIMESTER (HHS-HCC): ICD-10-CM

## 2024-08-13 DIAGNOSIS — Z86.79 HISTORY OF CARDIOMYOPATHY: Primary | ICD-10-CM

## 2024-08-13 DIAGNOSIS — R06.02 SHORTNESS OF BREATH: ICD-10-CM

## 2024-08-13 LAB
ALBUMIN SERPL BCP-MCNC: 3.9 G/DL (ref 3.4–5)
ALBUMIN SERPL BCP-MCNC: 3.9 G/DL (ref 3.4–5)
ALP SERPL-CCNC: 110 U/L (ref 33–110)
ALP SERPL-CCNC: 126 U/L (ref 33–110)
ALT SERPL W P-5'-P-CCNC: 215 U/L (ref 7–45)
ALT SERPL W P-5'-P-CCNC: 227 U/L (ref 7–45)
ANION GAP SERPL CALC-SCNC: 15 MMOL/L (ref 10–20)
ANION GAP SERPL CALC-SCNC: 17 MMOL/L (ref 10–20)
AST SERPL W P-5'-P-CCNC: 285 U/L (ref 9–39)
AST SERPL W P-5'-P-CCNC: 367 U/L (ref 9–39)
BILIRUB SERPL-MCNC: 0.4 MG/DL (ref 0–1.2)
BILIRUB SERPL-MCNC: 0.5 MG/DL (ref 0–1.2)
BNP SERPL-MCNC: 5 PG/ML (ref 0–99)
BUN SERPL-MCNC: 16 MG/DL (ref 6–23)
BUN SERPL-MCNC: 20 MG/DL (ref 6–23)
CALCIUM SERPL-MCNC: 9.4 MG/DL (ref 8.6–10.6)
CALCIUM SERPL-MCNC: 9.6 MG/DL (ref 8.6–10.6)
CARDIAC TROPONIN I PNL SERPL HS: 5 NG/L (ref 0–34)
CHLORIDE SERPL-SCNC: 106 MMOL/L (ref 98–107)
CHLORIDE SERPL-SCNC: 107 MMOL/L (ref 98–107)
CO2 SERPL-SCNC: 22 MMOL/L (ref 21–32)
CO2 SERPL-SCNC: 24 MMOL/L (ref 21–32)
CREAT SERPL-MCNC: 0.84 MG/DL (ref 0.5–1.05)
CREAT SERPL-MCNC: 1.03 MG/DL (ref 0.5–1.05)
EGFRCR SERPLBLD CKD-EPI 2021: 76 ML/MIN/1.73M*2
EGFRCR SERPLBLD CKD-EPI 2021: >90 ML/MIN/1.73M*2
ERYTHROCYTE [DISTWIDTH] IN BLOOD BY AUTOMATED COUNT: 13 % (ref 11.5–14.5)
GLUCOSE BLD MANUAL STRIP-MCNC: 115 MG/DL (ref 74–99)
GLUCOSE BLD MANUAL STRIP-MCNC: 140 MG/DL (ref 74–99)
GLUCOSE BLD MANUAL STRIP-MCNC: 51 MG/DL (ref 74–99)
GLUCOSE SERPL-MCNC: 47 MG/DL (ref 74–99)
GLUCOSE SERPL-MCNC: 96 MG/DL (ref 74–99)
HAV IGM SER QL: NONREACTIVE
HBV CORE IGM SER QL: NONREACTIVE
HBV SURFACE AG SERPL QL IA: NONREACTIVE
HCT VFR BLD AUTO: 40.1 % (ref 36–46)
HCV AB SER QL: NONREACTIVE
HGB BLD-MCNC: 12.8 G/DL (ref 12–16)
MCH RBC QN AUTO: 28.3 PG (ref 26–34)
MCHC RBC AUTO-ENTMCNC: 31.9 G/DL (ref 32–36)
MCV RBC AUTO: 89 FL (ref 80–100)
NRBC BLD-RTO: 0 /100 WBCS (ref 0–0)
PLATELET # BLD AUTO: 325 X10*3/UL (ref 150–450)
POC APPEARANCE, URINE: CLEAR
POC BILIRUBIN, URINE: NEGATIVE
POC BLOOD, URINE: ABNORMAL
POC COLOR, URINE: YELLOW
POC GLUCOSE, URINE: NEGATIVE MG/DL
POC KETONES, URINE: ABNORMAL MG/DL
POC LEUKOCYTES, URINE: NEGATIVE
POC NITRITE,URINE: NEGATIVE
POC PH, URINE: 5 PH
POC PROTEIN, URINE: ABNORMAL MG/DL
POC SPECIFIC GRAVITY, URINE: 1.01
POC UROBILINOGEN, URINE: 0.2 EU/DL
POTASSIUM SERPL-SCNC: 4.2 MMOL/L (ref 3.5–5.3)
POTASSIUM SERPL-SCNC: 4.3 MMOL/L (ref 3.5–5.3)
PROT SERPL-MCNC: 6.4 G/DL (ref 6.4–8.2)
PROT SERPL-MCNC: 6.8 G/DL (ref 6.4–8.2)
RBC # BLD AUTO: 4.52 X10*6/UL (ref 4–5.2)
SARS-COV-2 RNA RESP QL NAA+PROBE: NOT DETECTED
SODIUM SERPL-SCNC: 141 MMOL/L (ref 136–145)
SODIUM SERPL-SCNC: 142 MMOL/L (ref 136–145)
WBC # BLD AUTO: 4.5 X10*3/UL (ref 4.4–11.3)

## 2024-08-13 PROCEDURE — 3062F POS MACROALBUMINURIA REV: CPT | Performed by: OBSTETRICS & GYNECOLOGY

## 2024-08-13 PROCEDURE — 80074 ACUTE HEPATITIS PANEL: CPT | Performed by: STUDENT IN AN ORGANIZED HEALTH CARE EDUCATION/TRAINING PROGRAM

## 2024-08-13 PROCEDURE — 93010 ELECTROCARDIOGRAM REPORT: CPT | Performed by: INTERNAL MEDICINE

## 2024-08-13 PROCEDURE — 3078F DIAST BP <80 MM HG: CPT | Performed by: OBSTETRICS & GYNECOLOGY

## 2024-08-13 PROCEDURE — 99215 OFFICE O/P EST HI 40 MIN: CPT | Performed by: OBSTETRICS & GYNECOLOGY

## 2024-08-13 PROCEDURE — 71275 CT ANGIOGRAPHY CHEST: CPT | Performed by: STUDENT IN AN ORGANIZED HEALTH CARE EDUCATION/TRAINING PROGRAM

## 2024-08-13 PROCEDURE — 3044F HG A1C LEVEL LT 7.0%: CPT | Performed by: OBSTETRICS & GYNECOLOGY

## 2024-08-13 PROCEDURE — 87635 SARS-COV-2 COVID-19 AMP PRB: CPT

## 2024-08-13 PROCEDURE — 74177 CT ABD & PELVIS W/CONTRAST: CPT

## 2024-08-13 PROCEDURE — 2550000001 HC RX 255 CONTRASTS: Performed by: OBSTETRICS & GYNECOLOGY

## 2024-08-13 PROCEDURE — 82947 ASSAY GLUCOSE BLOOD QUANT: CPT

## 2024-08-13 PROCEDURE — 36415 COLL VENOUS BLD VENIPUNCTURE: CPT

## 2024-08-13 PROCEDURE — 83880 ASSAY OF NATRIURETIC PEPTIDE: CPT

## 2024-08-13 PROCEDURE — 85027 COMPLETE CBC AUTOMATED: CPT

## 2024-08-13 PROCEDURE — G0378 HOSPITAL OBSERVATION PER HR: HCPCS

## 2024-08-13 PROCEDURE — 99213 OFFICE O/P EST LOW 20 MIN: CPT

## 2024-08-13 PROCEDURE — 80053 COMPREHEN METABOLIC PANEL: CPT

## 2024-08-13 PROCEDURE — 86705 HEP B CORE ANTIBODY IGM: CPT | Performed by: STUDENT IN AN ORGANIZED HEALTH CARE EDUCATION/TRAINING PROGRAM

## 2024-08-13 PROCEDURE — 82150 ASSAY OF AMYLASE: CPT | Performed by: STUDENT IN AN ORGANIZED HEALTH CARE EDUCATION/TRAINING PROGRAM

## 2024-08-13 PROCEDURE — 82947 ASSAY GLUCOSE BLOOD QUANT: CPT | Performed by: OBSTETRICS & GYNECOLOGY

## 2024-08-13 PROCEDURE — 84484 ASSAY OF TROPONIN QUANT: CPT

## 2024-08-13 PROCEDURE — 2500000004 HC RX 250 GENERAL PHARMACY W/ HCPCS (ALT 636 FOR OP/ED)

## 2024-08-13 PROCEDURE — 81002 URINALYSIS NONAUTO W/O SCOPE: CPT

## 2024-08-13 PROCEDURE — 2720000007 HC OR 272 NO HCPCS

## 2024-08-13 PROCEDURE — 99215 OFFICE O/P EST HI 40 MIN: CPT | Mod: GC | Performed by: OBSTETRICS & GYNECOLOGY

## 2024-08-13 PROCEDURE — 1036F TOBACCO NON-USER: CPT | Performed by: OBSTETRICS & GYNECOLOGY

## 2024-08-13 PROCEDURE — 74177 CT ABD & PELVIS W/CONTRAST: CPT | Performed by: STUDENT IN AN ORGANIZED HEALTH CARE EDUCATION/TRAINING PROGRAM

## 2024-08-13 PROCEDURE — 93005 ELECTROCARDIOGRAM TRACING: CPT

## 2024-08-13 PROCEDURE — 71275 CT ANGIOGRAPHY CHEST: CPT

## 2024-08-13 PROCEDURE — 4010F ACE/ARB THERAPY RXD/TAKEN: CPT | Performed by: OBSTETRICS & GYNECOLOGY

## 2024-08-13 PROCEDURE — 83690 ASSAY OF LIPASE: CPT | Performed by: STUDENT IN AN ORGANIZED HEALTH CARE EDUCATION/TRAINING PROGRAM

## 2024-08-13 PROCEDURE — 3074F SYST BP LT 130 MM HG: CPT | Performed by: OBSTETRICS & GYNECOLOGY

## 2024-08-13 RX ORDER — LABETALOL HYDROCHLORIDE 5 MG/ML
20 INJECTION, SOLUTION INTRAVENOUS ONCE AS NEEDED
Status: DISCONTINUED | OUTPATIENT
Start: 2024-08-13 | End: 2024-08-14

## 2024-08-13 RX ORDER — HYDRALAZINE HYDROCHLORIDE 20 MG/ML
5 INJECTION INTRAMUSCULAR; INTRAVENOUS ONCE AS NEEDED
Status: DISCONTINUED | OUTPATIENT
Start: 2024-08-13 | End: 2024-08-14

## 2024-08-13 RX ORDER — NIFEDIPINE 10 MG/1
10 CAPSULE ORAL ONCE AS NEEDED
Status: DISCONTINUED | OUTPATIENT
Start: 2024-08-13 | End: 2024-08-14

## 2024-08-13 RX ORDER — LIDOCAINE HYDROCHLORIDE 10 MG/ML
0.5 INJECTION INFILTRATION; PERINEURAL ONCE AS NEEDED
Status: DISCONTINUED | OUTPATIENT
Start: 2024-08-13 | End: 2024-08-14

## 2024-08-13 RX ADMIN — IOHEXOL 100 ML: 350 INJECTION, SOLUTION INTRAVENOUS at 22:35

## 2024-08-13 RX ADMIN — SODIUM CHLORIDE, POTASSIUM CHLORIDE, SODIUM LACTATE AND CALCIUM CHLORIDE 1000 ML: 600; 310; 30; 20 INJECTION, SOLUTION INTRAVENOUS at 19:35

## 2024-08-13 SDOH — SOCIAL STABILITY: SOCIAL INSECURITY: PHYSICAL ABUSE: DENIES

## 2024-08-13 SDOH — SOCIAL STABILITY: SOCIAL INSECURITY: VERBAL ABUSE: DENIES

## 2024-08-13 SDOH — HEALTH STABILITY: MENTAL HEALTH: NON-SPECIFIC ACTIVE SUICIDAL THOUGHTS (PAST 1 MONTH): NO

## 2024-08-13 SDOH — HEALTH STABILITY: MENTAL HEALTH: SUICIDAL BEHAVIOR (LIFETIME): NO

## 2024-08-13 SDOH — HEALTH STABILITY: MENTAL HEALTH: WISH TO BE DEAD (PAST 1 MONTH): NO

## 2024-08-13 SDOH — ECONOMIC STABILITY: HOUSING INSECURITY: DO YOU FEEL UNSAFE GOING BACK TO THE PLACE WHERE YOU ARE LIVING?: NO

## 2024-08-13 SDOH — SOCIAL STABILITY: SOCIAL INSECURITY: ARE THERE ANY APPARENT SIGNS OF INJURIES/BEHAVIORS THAT COULD BE RELATED TO ABUSE/NEGLECT?: NO

## 2024-08-13 SDOH — HEALTH STABILITY: MENTAL HEALTH: WERE YOU ABLE TO COMPLETE ALL THE BEHAVIORAL HEALTH SCREENINGS?: YES

## 2024-08-13 SDOH — SOCIAL STABILITY: SOCIAL INSECURITY: DOES ANYONE TRY TO KEEP YOU FROM HAVING/CONTACTING OTHER FRIENDS OR DOING THINGS OUTSIDE YOUR HOME?: NO

## 2024-08-13 SDOH — SOCIAL STABILITY: SOCIAL INSECURITY: ABUSE SCREEN: ADULT

## 2024-08-13 SDOH — SOCIAL STABILITY: SOCIAL INSECURITY: HAVE YOU HAD THOUGHTS OF HARMING ANYONE ELSE?: YES

## 2024-08-13 SDOH — SOCIAL STABILITY: SOCIAL INSECURITY: ARE YOU OR HAVE YOU BEEN THREATENED OR ABUSED PHYSICALLY, EMOTIONALLY, OR SEXUALLY BY ANYONE?: NO

## 2024-08-13 SDOH — SOCIAL STABILITY: SOCIAL INSECURITY: DO YOU FEEL ANYONE HAS EXPLOITED OR TAKEN ADVANTAGE OF YOU FINANCIALLY OR OF YOUR PERSONAL PROPERTY?: NO

## 2024-08-13 SDOH — SOCIAL STABILITY: SOCIAL INSECURITY: HAS ANYONE EVER THREATENED TO HURT YOUR FAMILY OR YOUR PETS?: NO

## 2024-08-13 SDOH — SOCIAL STABILITY: SOCIAL INSECURITY: HAVE YOU HAD ANY THOUGHTS OF HARMING ANYONE ELSE?: NO

## 2024-08-13 ASSESSMENT — EDINBURGH POSTNATAL DEPRESSION SCALE (EPDS)
I HAVE FELT SCARED OR PANICKY FOR NO GOOD REASON: YES, SOMETIMES
I HAVE LOOKED FORWARD WITH ENJOYMENT TO THINGS: RATHER LESS THAN I USED TO
I HAVE BEEN ABLE TO LAUGH AND SEE THE FUNNY SIDE OF THINGS: AS MUCH AS I ALWAYS COULD
THINGS HAVE BEEN GETTING ON TOP OF ME: YES, SOMETIMES I HAVEN'T BEEN COPING AS WELL AS USUAL
I HAVE BLAMED MYSELF UNNECESSARILY WHEN THINGS WENT WRONG: YES, MOST OF THE TIME
I HAVE BEEN SO UNHAPPY THAT I HAVE HAD DIFFICULTY SLEEPING: NOT AT ALL
TOTAL SCORE: 15
THE THOUGHT OF HARMING MYSELF HAS OCCURRED TO ME: NEVER
I HAVE BEEN SO UNHAPPY THAT I HAVE BEEN CRYING: YES, QUITE OFTEN
I HAVE FELT SAD OR MISERABLE: YES, QUITE OFTEN
I HAVE BEEN ANXIOUS OR WORRIED FOR NO GOOD REASON: YES, VERY OFTEN

## 2024-08-13 ASSESSMENT — PATIENT HEALTH QUESTIONNAIRE - PHQ9
SUM OF ALL RESPONSES TO PHQ9 QUESTIONS 1 & 2: 0
1. LITTLE INTEREST OR PLEASURE IN DOING THINGS: NOT AT ALL
2. FEELING DOWN, DEPRESSED OR HOPELESS: NOT AT ALL

## 2024-08-13 ASSESSMENT — LIFESTYLE VARIABLES
HOW OFTEN DO YOU HAVE A DRINK CONTAINING ALCOHOL: NEVER
HOW OFTEN DO YOU HAVE 6 OR MORE DRINKS ON ONE OCCASION: NEVER
SKIP TO QUESTIONS 9-10: 1
AUDIT-C TOTAL SCORE: 0
HOW MANY STANDARD DRINKS CONTAINING ALCOHOL DO YOU HAVE ON A TYPICAL DAY: PATIENT DOES NOT DRINK
AUDIT-C TOTAL SCORE: 0

## 2024-08-13 ASSESSMENT — PAIN SCALES - GENERAL
PAINLEVEL_OUTOF10: 0 - NO PAIN
PAINLEVEL: 0-NO PAIN

## 2024-08-13 NOTE — H&P
"Obstetrical Admission History and Physical       ASSESSMENT & PLAN: Estefani Alvarenga is a 29 y.o.  now PPD39 from  c/b siPEC w/ SF who presents to triage with shortness of breath.    Plan:      SOB  - Intermittent tachycardia, 120s on presentation, now 80-110s  - SpO2 93-97% on RA, afebrile  - Physical exam WNL  - BNP 5, troponin negative  - COVID negative  - Concern for PE: CT PE pending  - 1 L LR bolus for before/after CT for renal protection    T1DM  -   - Urine dip pending  - Low c/f DKA at this time    siPEC w/ SF, elevated LFTs  - enalapril 10 mg and nifedipine 120 mg, consider de-escalating regimen given low-to-normal BPs  - HELLP labs n/f elevated LFTS: AST//227 (14/8 on )  - Epigastric pain with deep inspiration  - SOB, intermittent, see above  - Hepatitis panel pending  - Repeat CMP 2200 to trend LFTs  - CT abdomen/pelvis pending to evaluate for etiology of elevated LFTs    Maternal Well-being  - All questions and concerns addressed     Dispo:  -Triage for further workup    -Discussed plan and reviewed with: Dr. Cantor . Handoff to Dr. Gutierrez for further management.      Subjective      Shortness of breath intermittent for 1-1.5 weeks, now more frequent and occurring at rest. Pain in epigastrium with deep inspiration. Note she feels her heart beat is fast, but denies chest pain and palpitations. No known sick contacts. Denies symptoms of systemic illness.    Blood sugar was a little high at the end of last week, now normal.     Denies recent use of ibuprofen or tylenol. She still has \"lots\" left from the prescription sent postpartum as her pain improved rapidly. Last took tylenol a couple weeks ago. Denies N/V, bloody stools. Reports epistaxis x2 recently, but they were not heavy and stopped quickly.    Pregnancy Problems (from 24 to present)       Problem Noted Resolved    Pre-eclampsia, third trimester ([facility]) 2024 by Cynthia Jennings, RN No    Priority:  Medium      " Preeclampsia, third trimester ([facility]) 7/3/2024 by Ana Lilia Lopez MD No    Priority:  Medium      Anxiety in pregnancy in second trimester, antepartum ([facility]) 2024 by Heydi Raphael MD No    Priority:  Medium      Overview Signed 2024  5:32 PM by Krissy Giraldo     - Mentioned devastated by unexpected loss of coworker at  appt  - on duloxetine  -  behavioral health appt          Urinary tract infection in mother during second trimester of pregnancy ([facility]) 3/18/2024 by Karis Cadena MD No    Priority:  Medium      Overview Addendum 4/3/2024  5:40 PM by Heydi Raphael MD     Pan-sensitive E coli 3/14/24. Amox 875 BID for 7 days  [X] AMARIS neg         Guillain Barré syndrome (Multi) 3/14/2024 by Karis Cadena MD No    Priority:  Medium      Overview Signed 3/14/2024  3:07 PM by Karis Cadena MD     Associated with muscle weakness and pain. Follows with pain management         Screening, , isoimmunization ([facility]) 2024 by Mello Munoz MD No    Priority:  Medium      Overview Signed 2024  6:03 PM by Mello Munoz MD     Ab positive, all clinically relative Abs ruled out.    Will cross for 2 units upon admission for delviery         History of cardiomyopathy 2024 by Yoni Davis MD No    Priority:  Medium      Overview Addendum 2024  3:19 PM by Mónica Beltran MD     - Dx in the setting of DKA c/b cardiogenic shock requiring ICU admission in   [/] Cards - seen , resume metop tartrate 12.5mg BID  [/] Echo - normal, EF 55-60%         34 weeks gestation of pregnancy ([facility]) 2024 by Yoni Davis MD No    Priority:  Medium      Overview Addendum 2024  1:07 PM by Krissy Giraldo     Datin24, 11w6d  [x] Initial BMI: 30  [x] Prenatal Labs: Antibody +, ruled out for clinically significant antibodies   [x] Genetic Screening:  cfDNA rr, XX  [x] Baby ASA  [x] Anatomy US: 3/14/2024   [x] Tdap  (27-36wks):   [] Flu Shot:  [x] COVID vaccine:   [x] Rhogam (if Rh neg): A+  [x] GBS at 36 wks: collected  - pending  [] Breastfeeding  [x] Postpartum Birth control method: POPs for first 6 weeks, then NuvaRing   [x] 39 weeks discussion of IOL vs. Expectant management: IOL scheduled   [x] Mode of delivery:  Desires vaginal delivery         Hypertension affecting pregnancy in second trimester ([facility]) 2024 by Yoni Davis MD No    Priority:  Medium      Overview Addendum 2024  4:37 PM by Krissy Giraldo     - HELLP labs neg   - P:C: 0.11  - bASA, metoprolol 25 mg  - Gave home BP cuff         Hypothyroidism affecting pregnancy in second trimester ([facility]) 11/3/2023 by Rylee Bolton No    Priority:  Medium      Overview Addendum 2024  4:38 PM by Krissy Giraldo     On levothyroxine 50 mcg daily   TSH 2.45   TSH 1.70    Will repeat every trimester.         Polyradiculopathy 11/3/2023 by Rylee Bolton No    Priority:  Medium      Overview Addendum 2024  4:40 PM by Krissy Giraldo     Counseled on medication use at first PNV. Currently on duloxetine, topirimate, pregabalin.    [x] 3rd trimester anesthesia consult - per Dr. Munoz, anesthesia on board and no consult needed  [ ] PT referral as needed.         Type 1 diabetes mellitus during pregnancy in second trimester ([facility]) 11/3/2023 by Rylee Bolton No    Priority:  Medium      Overview Addendum 2024  6:04 PM by Mello Munoz MD     [X] Baseline pre-e labs serum labs wnl. UPC pending (however no microalbuminuria in  and so no baseline proteinuria)  [/] Ophtho - referral placed 3/14  [/] EKG - ordered 3/14  [/] Echo - ordered 3/14  [X] TSH: 2.45  /1\ A1c: 7.4  /1\ urine cx: missed  /2\ urine cx: neg  /3\ urine cx: ordered 2024  Level 2: 3/14  Fetal ECHO: wnl    Current regimen: last updated 2024  Omnipod using AID  Target B  DOA 2 hrs    Basal (IF manual mode):  00-05 = 1.0  05-21 =  1.2  21-00 = 1.0    Bolus:  00-05: 1:12  05-10: 1:8  10-17: 1:7*  17-00: 1:8    CF:  00-21 = 1:25  21-00 = 1:50    2024 : no changes  2024 :  Basal(manual):  00 = 1.0  05 = 1.2  21 = 1.0  Bolus:  00 = 1:10 (no change)  06 = 1:8 --> 1:7*  17 = 1:10 --> 1:9*  CF: 1:25    2024 : no changes    2024 Only change Insulin:carb ratio   12:00 AM (6 hr) 9 g/Unit  6:00 AM (11 hr) 7 g/Unit --> 6g*  5:00 PM (7 hr) 9 g/Unit--> 8g*      :24: Only change to insulin carb ration     00 = 1:9 (no change)  06 = 1:8 --> 1:8 --> 1:7 -> 1:6 ()  17 = 1:10 --> 1:10 --> 1:9 -> 1:8()  CF: 1:25   Sensitivity Factor 25 -->20 ()    2024  see note for full pump settings  IInsulin : Carb Ratios  12:00 AM (6 hr) 9 g/Unit  6:00 AM (11 hr) 6 g/Unit  Add new time 11AM -5PM =  5g*  5:00 PM (7 hr) 8 g/Unit --> 7g*    2024 :   Basal --> automode    Bolus:  00 = 1:9  06 = 1:6 --> 1:5*  17 = 1:8 --> 1:7*    CF:  00 = 1:20     24:  Target B  DOA 2 hrs  Reverse Correction: OFF  Basal (IF manual mode):  00-05 = 1.0  05-21 = 1.2  21- = 1.0    Bolus:  00-06: 1:9  06-17: 1:5  17-00: 1:7-->1:6    CF:  00-00 = 1:20    Intrapartum plan:  Desires insulin infusion.      Postpartum Plan:  pending                 OB History    Para Term  AB Living   2 1 0 1 0 1   SAB IAB Ectopic Multiple Live Births   0 0 0 0 1      # Outcome Date GA Lbr Bipin/2nd Weight Sex Type Anes PTL Lv   2  24 35w2d 29:12 / 01:45 2.83 kg F Vag-Spont EPI  SELENE      Name: Ursula Alvarenga      Apgar1: 2  Apgar5: 5   1                 Past Surgical History:   Procedure Laterality Date    OTHER SURGICAL HISTORY  2020    Exploratory laparoscopy       Social History     Tobacco Use    Smoking status: Never    Smokeless tobacco: Never   Substance Use Topics    Alcohol use: Never       Allergies   Allergen Reactions    Shellfish Derived Unknown    Shellfish Derived Hives and Itching       Medications Prior to  "Admission   Medication Sig Dispense Refill Last Dose    acetaminophen (Tylenol) 325 mg tablet Take 2 tablets (650 mg) by mouth every 6 hours if needed for mild pain (1 - 3). 30 tablet 2     acetone, urine, test (Ketone Urine Test) strip Test urine when BGM sustained above 200, sick days and as advised by provider 1 strip 3     aspirin 81 mg EC tablet Take 1 tablet (81 mg) by mouth once daily. 30 tablet 11     BD Yissel 2nd Gen Pen Needle 32 gauge x 5/32\" needle        blood pressure monitor (Blood Pressure Kit) kit 1 Application once daily. 1 kit 0     blood pressure test kit-large kit Use to test BP twice daily during pregnancy 1 each 0     blood sugar diagnostic (Accu-Chek Guide test strips) strip 4 times a day.       Dexcom G6 Sensor device Use 1 sensor and replace every 10 days. 3 each 11     Dexcom G6 Transmitter device Use as instructed, replace every 90 days. 1 each 3     Dexcom G6 Transmitter device        docusate sodium (Colace) 100 mg capsule Take 1 capsule (100 mg) by mouth 2 times a day.       docusate sodium (Colace) 100 mg capsule Take 1 capsule (100 mg) by mouth 2 times a day as needed for constipation. 30 capsule 5     DULoxetine (Cymbalta) 60 mg DR capsule Take 1 capsule (60 mg) by mouth once daily. Do not crush or chew. 90 capsule 1     DULoxetine (Cymbalta) 60 mg DR capsule Take 1 capsule (60 mg) by mouth once daily.       enalapril (Vasotec) 10 mg tablet Take 1 tablet (10 mg) by mouth once daily. 30 tablet 3     ergocalciferol (Vitamin D-2) 1.25 MG (68109 UT) capsule Take by mouth.       fluticasone (Flonase) 50 mcg/actuation nasal spray Administer 1 spray into each nostril once daily. Shake gently. Before first use, prime pump. After use, clean tip and replace cap. 16 g 12     glucagon (Baqsimi) 3 mg/actuation spray,non-aerosol Squirt in to nose for tx of severe hypoglycemia.  Keep one at work, home and travel 2 each 3     HumaLOG U-100 Insulin 100 unit/mL injection Use up to 75 units daily per " insulin pump settings 10 mL 11     ibuprofen 600 mg tablet Take 1 tablet (600 mg) by mouth every 6 hours if needed for mild pain (1 - 3). 30 tablet 2     insulin glargine (Lantus) 100 unit/mL injection Inject under the skin.       insulin glargine-yfgn (Semglee,insulin glarg-yfgn,Pen) 100 unit/mL (3 mL) Pen Inject under the skin.       insulin lispro-aabc (Lyumjev U-100 Insulin) 100 unit/mL solution Take up to 150 units per day in insulin pump 45 mL 11     insulin pump cart,automated,BT (Omnipod 5 G6 Pods, Gen 5,) cartridge Inject 1 each under the skin every other day. Change pod every other day days as directed during pregnancy 15 each 11     Lantus Solostar U-100 Insulin 100 unit/mL (3 mL) pen        levothyroxine (Synthroid, Levoxyl) 50 mcg tablet Take 1 tablet daily 90 tablet 3     levothyroxine (Synthroid, Levoxyl) 50 mcg tablet Take 1 tablet (50 mcg) by mouth early in the morning..       Lyumjev U-100 Insulin 100 unit/mL solution        melatonin 10 mg tablet Take 1 tablet (10 mg) by mouth once daily.       metoprolol tartrate (Lopressor) 25 mg tablet Take 0.5 tablets (12.5 mg) by mouth 2 times a day. 90 tablet 3     metoprolol tartrate (Lopressor) 25 mg tablet Take 1 tablet (25 mg) by mouth 2 times a day.       NIFEdipine ER (Adalat CC) 60 mg 24 hr tablet Take 1 tablet (60 mg) by mouth 2 times a day. Do not crush, chew, or split. 30 tablet 3     norethindrone (Micronor) 0.35 mg tablet Take 1 tablet (0.35 mg) over 28 days by mouth once daily. 30 tablet 12     Omnipod 5 G6 Intro Kit, Gen 5, cartridge        ondansetron (Zofran) 4 mg tablet Take 1 tablet (4 mg) by mouth every 6 hours if needed for nausea or vomiting. 20 tablet 0     polyethylene glycol (Glycolax, Miralax) 17 gram packet Take 17 g by mouth once daily as needed (constipation). 30 packet 2     pregabalin (Lyrica) 300 mg capsule Take 1 capsule (300 mg) by mouth 2 times a day. 60 capsule 5     thiamine 100 mg tablet Take by mouth.       topiramate  (Topamax) 50 mg tablet Take 1 tablet in the morning and 2 tablets at bedtime daily. 90 tablet 11      Objective     Last Vitals  Temp Pulse Resp BP MAP O2 Sat   36.3 °C (97.3 °F) 105   122/63 81 97 %         Physical Exam    General: Examination reveals a well developed, well nourished, female, in no acute distress. She is alert and cooperative.  Lungs: symmetrical, non-labored breathing. CTA BL  Cardiac: warm, well-perfused. S1&S2, no murmur, regular  Abdomen: soft, non-tender.  Extremities: no redness or tenderness in the calves or thighs. Trace edema in BL feet.  Neurological: alert, oriented, normal speech, no focal findings or movement disorder noted.     Admission on 08/13/2024   Component Date Value Ref Range Status    WBC 08/13/2024 4.5  4.4 - 11.3 x10*3/uL Final    nRBC 08/13/2024 0.0  0.0 - 0.0 /100 WBCs Final    RBC 08/13/2024 4.52  4.00 - 5.20 x10*6/uL Final    Hemoglobin 08/13/2024 12.8  12.0 - 16.0 g/dL Final    Hematocrit 08/13/2024 40.1  36.0 - 46.0 % Final    MCV 08/13/2024 89  80 - 100 fL Final    MCH 08/13/2024 28.3  26.0 - 34.0 pg Final    MCHC 08/13/2024 31.9 (L)  32.0 - 36.0 g/dL Final    RDW 08/13/2024 13.0  11.5 - 14.5 % Final    Platelets 08/13/2024 325  150 - 450 x10*3/uL Final    Glucose 08/13/2024 96  74 - 99 mg/dL Final    Sodium 08/13/2024 142  136 - 145 mmol/L Final    Potassium 08/13/2024 4.2  3.5 - 5.3 mmol/L Final    Chloride 08/13/2024 107  98 - 107 mmol/L Final    Bicarbonate 08/13/2024 22  21 - 32 mmol/L Final    Anion Gap 08/13/2024 17  10 - 20 mmol/L Final    Urea Nitrogen 08/13/2024 20  6 - 23 mg/dL Final    Creatinine 08/13/2024 1.03  0.50 - 1.05 mg/dL Final    eGFR 08/13/2024 76  >60 mL/min/1.73m*2 Final    Calculations of estimated GFR are performed using the 2021 CKD-EPI Study Refit equation without the race variable for the IDMS-Traceable creatinine methods.  https://jasn.asnjournals.org/content/early/2021/09/22/ASN.4277943071    Calcium 08/13/2024 9.6  8.6 - 10.6 mg/dL  Final    Albumin 08/13/2024 3.9  3.4 - 5.0 g/dL Final    Alkaline Phosphatase 08/13/2024 126 (H)  33 - 110 U/L Final    Total Protein 08/13/2024 6.8  6.4 - 8.2 g/dL Final    AST 08/13/2024 367 (H)  9 - 39 U/L Final    Bilirubin, Total 08/13/2024 0.5  0.0 - 1.2 mg/dL Final    ALT 08/13/2024 227 (H)  7 - 45 U/L Final    Patients treated with Sulfasalazine may generate falsely decreased results for ALT.    BNP 08/13/2024 5  0 - 99 pg/mL Final    Troponin I, High Sensitivity ([facility]) 08/13/2024 5  0 - 34 ng/L Final    Coronavirus 2019, PCR 08/13/2024 Not Detected  Not Detected Final    POCT Glucose 08/13/2024 115 (H)  74 - 99 mg/dL Final   Office Visit on 08/13/2024   Component Date Value Ref Range Status    POCT Glucose 08/13/2024 140 (H)  74 - 99 mg/dL Final            Objective    Last Vitals  Temp Pulse Resp BP MAP O2 Sat   36.3 °C (97.3 °F) 105   122/63 81 97 %

## 2024-08-13 NOTE — PROGRESS NOTES
[facility] Follow-up  2024     SUBJECTIVE    HPI: Estefani Alvarenga is a 29 y.o.  at 35w2d here for a PP visit.    Delivered:  @ 35.2wga, IOL in s/o of sPEC  Mode of delivery: uncomplicated     Pt overall states she's been doing well since delivery; however, within the last week, she's been experiencing random episodes of SOB, dizziness, and palpitations. She denies any specific triggers but the episodes frequently occur with normal activity such as walking from room to room. She denies new onset LE swelling, orthopnea, or paroxsymal nocturnal dyspnea. Episodes have been increasing in frequency since onset.    As far as her sPEC, pt has been doing well since delivery. Denies HA, vision changes, RUQ pain, or swelling. She states her home BP has ranged 120s/80-90s. She did not bring her log with her today. She continues to take nifed 60 BID & enalapril 10mg daily.    PP   Bleeding - significantly decreased since delivery wnl  Pain - denies pain. 0/10  Diet- pt has normal appetite with good oral intake   Breast or bottle - breastfeeding & bottle feeding, doing well, no issues  Sexual activity - has not returned to sexual activity  Support at home- great support. Has her , parents, and aunt and uncle who are very supportive  Mood- Denies depression but has felt increasingly more anxious with having a new baby.  returns to work tomorrow. Anxious and periodically overwhelmed. Anxiousness occasionally disrupts her daily routine.  BC: POPs. Would like to discuss switching to Nuvaring      OBJECTIVE    Visit Vitals  BP: 94/53  Pulse: 139    General Appearance: no acute distress  Lungs: normal work of breathing, CTAB  Heart: tachycardic, normal rhythm, no rubs, murmurs, or gallops  Extremities: no edema appreciated  Musculoskeletal: normal ROM  Neurologic: no gross deficits  Psych exam: normal mood and affect      ASSESSMENT & PLAN    Estefani Alvarenga is a 29 y.o.  at 35w2d here for the following  concerns we addressed today:    Shortness of breath  Pt reporting episodes of SOB and dizziness x 1 week. Occurring with normal movements and activity. Increasing in frequency. Denies orthopnea, new LE swelling, or paroxsymal nocturnal dyspnea.  Physical exam benign. Hypotensive & tachycardic as documented above.    Pt has pior h/o of cardiomyopathy dx in 2021 in the s/o DKA complicated by cardiogenic shock. Normal echo performed this pregnancy.    Considering her history, it's recommended she present to the OB ED for further work-up to r/o peripartum cardiomyopathy, PE, or other concerning cardiac /pulmonary etiologies for current symptoms.   It's possible pt is hypotensive and experiencing SOB secondary to her HTN meds and may simply need med adjustment; however, there is a low threshold for need for further work-up considering her extensive PMHx.    Severe pre-eclampsia in third trimester ([facility])  S/p IOL in s/o sPEC @ 35.2wga. Delivered 07/05. Now PP  Was diagnosed at that time by SRBPs requiring IV tx   Discharged on nifed 60 BID & enalapril 10mg   Pt has continued with med regimen as prescribed and states home BP has ranged 120/80-90s.    Pt is hypotensive in office today (94/63) with HR of 139. Experiencing SOB as documented above. Following work-up and r/o of concerning etiologies, will down titrate meds     Type 1 diabetes mellitus with diabetic neuropathy (Multi)  POCT-104  No changes to mgmt at this time. Pt is doing well with current insulin pump settings.       Encounter for postpartum care after hospital delivery ([facility])  Postpartum anxiety ([facility])  -Overall pt is meeting all expected routine PP goals; however, she's experiencing increased anxiety that's beginning to affect her daily routine. She's interested in speaking with a therapist. She has seen behavioral health within the pregnancy and felt it helped at that time.  Requesting Nuvaring instead of POPs    -placed behavioral health referral  today  -cont duloxetine  -recommend continuing w/ POPs for the time being. Nuvaring contains estrogen which poses a higher risk for VTE and cardiac effects. Recommend the pt have new onset SOB/dizziness addressed before switching BC methods      Pt understands all discussed and is agreeable to plans as discussed above.    Orders Placed This Encounter   Procedures    Referral to Ob-Gyn Behavioral Health    POCT GLUCOSE       RTC in 2 weeks.    Patient seen and evaluated with Dr. Alexander Gonzalez MD  OBGYN, PGY-1

## 2024-08-14 ENCOUNTER — APPOINTMENT (OUTPATIENT)
Dept: CARDIOLOGY | Facility: HOSPITAL | Age: 29
DRG: 776 | End: 2024-08-14
Payer: COMMERCIAL

## 2024-08-14 LAB
A1AT SERPL NEPH-MCNC: 141 MG/DL (ref 84–218)
AFP SERPL-MCNC: 7 NG/ML (ref 0–9)
ALBUMIN SERPL BCP-MCNC: 3.5 G/DL (ref 3.4–5)
ALP SERPL-CCNC: 104 U/L (ref 33–110)
ALT SERPL W P-5'-P-CCNC: 183 U/L (ref 7–45)
AMYLASE SERPL-CCNC: 43 U/L (ref 29–103)
ANION GAP SERPL CALC-SCNC: 14 MMOL/L
AORTIC VALVE MEAN GRADIENT: 4 MMHG
AORTIC VALVE PEAK VELOCITY: 1.39 M/S
AST SERPL W P-5'-P-CCNC: 258 U/L (ref 9–39)
ATRIAL RATE: 110 BPM
AV PEAK GRADIENT: 7.7 MMHG
AVA (PEAK VEL): 2.55 CM2
AVA (VTI): 2.69 CM2
BILIRUB SERPL-MCNC: 0.5 MG/DL (ref 0–1.2)
BUN SERPL-MCNC: 14 MG/DL (ref 6–23)
CALCIUM SERPL-MCNC: 8.8 MG/DL (ref 8.6–10.6)
CERULOPLASMIN SERPL-MCNC: 35.8 MG/DL (ref 20–60)
CHLORIDE SERPL-SCNC: 107 MMOL/L (ref 98–107)
CO2 SERPL-SCNC: 24 MMOL/L (ref 21–32)
CREAT SERPL-MCNC: 0.61 MG/DL (ref 0.5–1.05)
EGFRCR SERPLBLD CKD-EPI 2021: >90 ML/MIN/1.73M*2
EJECTION FRACTION APICAL 4 CHAMBER: 56.3
EJECTION FRACTION: 53 %
FERRITIN SERPL-MCNC: 136 NG/ML (ref 8–150)
GLUCOSE BLD MANUAL STRIP-MCNC: 127 MG/DL (ref 74–99)
GLUCOSE BLD MANUAL STRIP-MCNC: 191 MG/DL (ref 74–99)
GLUCOSE BLD MANUAL STRIP-MCNC: 194 MG/DL (ref 74–99)
GLUCOSE BLD MANUAL STRIP-MCNC: 43 MG/DL (ref 74–99)
GLUCOSE BLD MANUAL STRIP-MCNC: 49 MG/DL (ref 74–99)
GLUCOSE BLD MANUAL STRIP-MCNC: 80 MG/DL (ref 74–99)
GLUCOSE BLD MANUAL STRIP-MCNC: 80 MG/DL (ref 74–99)
GLUCOSE BLD MANUAL STRIP-MCNC: 83 MG/DL (ref 74–99)
GLUCOSE SERPL-MCNC: 36 MG/DL (ref 74–99)
INR PPP: 0.9 (ref 0.9–1.1)
IRON SATN MFR SERPL: 28 % (ref 25–45)
IRON SERPL-MCNC: 78 UG/DL (ref 35–150)
LEFT ATRIUM VOLUME AREA LENGTH INDEX BSA: 21.1 ML/M2
LEFT VENTRICLE INTERNAL DIMENSION DIASTOLE: 5.16 CM (ref 3.5–6)
LEFT VENTRICULAR OUTFLOW TRACT DIAMETER: 2.09 CM
LIPASE SERPL-CCNC: 47 U/L (ref 9–82)
MITRAL VALVE E/A RATIO: 0.99
P AXIS: 41 DEGREES
P OFFSET: 208 MS
P ONSET: 158 MS
POTASSIUM SERPL-SCNC: 3.6 MMOL/L (ref 3.5–5.3)
PR INTERVAL: 124 MS
PROT SERPL-MCNC: 6.1 G/DL (ref 6.4–8.2)
PROTHROMBIN TIME: 9.7 SECONDS (ref 9.8–12.8)
Q ONSET: 220 MS
QRS COUNT: 18 BEATS
QRS DURATION: 84 MS
QT INTERVAL: 338 MS
QTC CALCULATION(BAZETT): 457 MS
QTC FREDERICIA: 414 MS
R AXIS: 83 DEGREES
RIGHT VENTRICLE FREE WALL PEAK S': 11 CM/S
SODIUM SERPL-SCNC: 141 MMOL/L (ref 136–145)
T AXIS: 38 DEGREES
T OFFSET: 389 MS
TIBC SERPL-MCNC: 275 UG/DL (ref 240–445)
TRICUSPID ANNULAR PLANE SYSTOLIC EXCURSION: 1.6 CM
UIBC SERPL-MCNC: 197 UG/DL (ref 110–370)
VENTRICULAR RATE: 110 BPM

## 2024-08-14 PROCEDURE — 2500000001 HC RX 250 WO HCPCS SELF ADMINISTERED DRUGS (ALT 637 FOR MEDICARE OP): Performed by: STUDENT IN AN ORGANIZED HEALTH CARE EDUCATION/TRAINING PROGRAM

## 2024-08-14 PROCEDURE — 86790 VIRUS ANTIBODY NOS: CPT

## 2024-08-14 PROCEDURE — 82390 ASSAY OF CERULOPLASMIN: CPT

## 2024-08-14 PROCEDURE — G0378 HOSPITAL OBSERVATION PER HR: HCPCS

## 2024-08-14 PROCEDURE — 82105 ALPHA-FETOPROTEIN SERUM: CPT

## 2024-08-14 PROCEDURE — 85610 PROTHROMBIN TIME: CPT | Performed by: OBSTETRICS & GYNECOLOGY

## 2024-08-14 PROCEDURE — 2500000002 HC RX 250 W HCPCS SELF ADMINISTERED DRUGS (ALT 637 FOR MEDICARE OP, ALT 636 FOR OP/ED): Performed by: STUDENT IN AN ORGANIZED HEALTH CARE EDUCATION/TRAINING PROGRAM

## 2024-08-14 PROCEDURE — 36415 COLL VENOUS BLD VENIPUNCTURE: CPT

## 2024-08-14 PROCEDURE — 86015 ACTIN ANTIBODY EACH: CPT

## 2024-08-14 PROCEDURE — 82103 ALPHA-1-ANTITRYPSIN TOTAL: CPT

## 2024-08-14 PROCEDURE — 83540 ASSAY OF IRON: CPT

## 2024-08-14 PROCEDURE — 93306 TTE W/DOPPLER COMPLETE: CPT | Performed by: SPECIALIST

## 2024-08-14 PROCEDURE — 36415 COLL VENOUS BLD VENIPUNCTURE: CPT | Performed by: OBSTETRICS & GYNECOLOGY

## 2024-08-14 PROCEDURE — 99223 1ST HOSP IP/OBS HIGH 75: CPT | Performed by: STUDENT IN AN ORGANIZED HEALTH CARE EDUCATION/TRAINING PROGRAM

## 2024-08-14 PROCEDURE — 82947 ASSAY GLUCOSE BLOOD QUANT: CPT

## 2024-08-14 PROCEDURE — 99222 1ST HOSP IP/OBS MODERATE 55: CPT | Performed by: PATHOLOGY

## 2024-08-14 PROCEDURE — 86038 ANTINUCLEAR ANTIBODIES: CPT

## 2024-08-14 PROCEDURE — 82728 ASSAY OF FERRITIN: CPT

## 2024-08-14 PROCEDURE — 84075 ASSAY ALKALINE PHOSPHATASE: CPT

## 2024-08-14 PROCEDURE — 86381 MITOCHONDRIAL ANTIBODY EACH: CPT

## 2024-08-14 PROCEDURE — 1210000001 HC SEMI-PRIVATE ROOM DAILY

## 2024-08-14 PROCEDURE — 2500000001 HC RX 250 WO HCPCS SELF ADMINISTERED DRUGS (ALT 637 FOR MEDICARE OP)

## 2024-08-14 PROCEDURE — 93306 TTE W/DOPPLER COMPLETE: CPT

## 2024-08-14 RX ORDER — OXYTOCIN 10 [USP'U]/ML
10 INJECTION, SOLUTION INTRAMUSCULAR; INTRAVENOUS ONCE AS NEEDED
Status: DISCONTINUED | OUTPATIENT
Start: 2024-08-14 | End: 2024-08-15 | Stop reason: HOSPADM

## 2024-08-14 RX ORDER — OXYTOCIN/0.9 % SODIUM CHLORIDE 30/500 ML
60 PLASTIC BAG, INJECTION (ML) INTRAVENOUS ONCE AS NEEDED
Status: DISCONTINUED | OUTPATIENT
Start: 2024-08-14 | End: 2024-08-15 | Stop reason: HOSPADM

## 2024-08-14 RX ORDER — LOPERAMIDE HYDROCHLORIDE 2 MG/1
4 CAPSULE ORAL EVERY 2 HOUR PRN
Status: DISCONTINUED | OUTPATIENT
Start: 2024-08-14 | End: 2024-08-15 | Stop reason: HOSPADM

## 2024-08-14 RX ORDER — METOCLOPRAMIDE HYDROCHLORIDE 5 MG/ML
10 INJECTION INTRAMUSCULAR; INTRAVENOUS EVERY 6 HOURS PRN
Status: DISCONTINUED | OUTPATIENT
Start: 2024-08-14 | End: 2024-08-15 | Stop reason: HOSPADM

## 2024-08-14 RX ORDER — PREGABALIN 75 MG/1
300 CAPSULE ORAL 2 TIMES DAILY
Status: DISCONTINUED | OUTPATIENT
Start: 2024-08-14 | End: 2024-08-15 | Stop reason: HOSPADM

## 2024-08-14 RX ORDER — DEXTROSE 50 % IN WATER (D50W) INTRAVENOUS SYRINGE
25
Status: DISCONTINUED | OUTPATIENT
Start: 2024-08-14 | End: 2024-08-15 | Stop reason: HOSPADM

## 2024-08-14 RX ORDER — NIFEDIPINE 60 MG/1
60 TABLET, FILM COATED, EXTENDED RELEASE ORAL
Status: DISCONTINUED | OUTPATIENT
Start: 2024-08-14 | End: 2024-08-15 | Stop reason: HOSPADM

## 2024-08-14 RX ORDER — ADHESIVE BANDAGE
10 BANDAGE TOPICAL
Status: DISCONTINUED | OUTPATIENT
Start: 2024-08-14 | End: 2024-08-15 | Stop reason: HOSPADM

## 2024-08-14 RX ORDER — ACETAMINOPHEN 325 MG/1
975 TABLET ORAL EVERY 6 HOURS SCHEDULED
Status: DISCONTINUED | OUTPATIENT
Start: 2024-08-14 | End: 2024-08-15 | Stop reason: HOSPADM

## 2024-08-14 RX ORDER — NIFEDIPINE 10 MG/1
10 CAPSULE ORAL ONCE AS NEEDED
Status: DISCONTINUED | OUTPATIENT
Start: 2024-08-14 | End: 2024-08-15 | Stop reason: HOSPADM

## 2024-08-14 RX ORDER — ONDANSETRON HYDROCHLORIDE 2 MG/ML
4 INJECTION, SOLUTION INTRAVENOUS EVERY 6 HOURS PRN
Status: DISCONTINUED | OUTPATIENT
Start: 2024-08-14 | End: 2024-08-15 | Stop reason: HOSPADM

## 2024-08-14 RX ORDER — AMOXICILLIN 250 MG
2 CAPSULE ORAL NIGHTLY PRN
Status: DISCONTINUED | OUTPATIENT
Start: 2024-08-14 | End: 2024-08-15 | Stop reason: HOSPADM

## 2024-08-14 RX ORDER — LIDOCAINE 560 MG/1
1 PATCH PERCUTANEOUS; TOPICAL; TRANSDERMAL
Status: DISCONTINUED | OUTPATIENT
Start: 2024-08-14 | End: 2024-08-15 | Stop reason: HOSPADM

## 2024-08-14 RX ORDER — BISACODYL 10 MG/1
10 SUPPOSITORY RECTAL DAILY PRN
Status: DISCONTINUED | OUTPATIENT
Start: 2024-08-14 | End: 2024-08-15 | Stop reason: HOSPADM

## 2024-08-14 RX ORDER — DIPHENHYDRAMINE HCL 25 MG
25 CAPSULE ORAL EVERY 6 HOURS PRN
Status: DISCONTINUED | OUTPATIENT
Start: 2024-08-14 | End: 2024-08-15 | Stop reason: HOSPADM

## 2024-08-14 RX ORDER — TRANEXAMIC ACID 100 MG/ML
1000 INJECTION, SOLUTION INTRAVENOUS ONCE AS NEEDED
Status: DISCONTINUED | OUTPATIENT
Start: 2024-08-14 | End: 2024-08-15 | Stop reason: HOSPADM

## 2024-08-14 RX ORDER — LABETALOL HYDROCHLORIDE 5 MG/ML
20 INJECTION, SOLUTION INTRAVENOUS ONCE AS NEEDED
Status: DISCONTINUED | OUTPATIENT
Start: 2024-08-14 | End: 2024-08-15 | Stop reason: HOSPADM

## 2024-08-14 RX ORDER — IBUPROFEN 600 MG/1
600 TABLET ORAL EVERY 6 HOURS SCHEDULED
Status: DISCONTINUED | OUTPATIENT
Start: 2024-08-14 | End: 2024-08-15 | Stop reason: HOSPADM

## 2024-08-14 RX ORDER — POLYETHYLENE GLYCOL 3350 17 G/17G
17 POWDER, FOR SOLUTION ORAL 2 TIMES DAILY PRN
Status: DISCONTINUED | OUTPATIENT
Start: 2024-08-14 | End: 2024-08-15 | Stop reason: HOSPADM

## 2024-08-14 RX ORDER — DIPHENHYDRAMINE HYDROCHLORIDE 50 MG/ML
25 INJECTION INTRAMUSCULAR; INTRAVENOUS EVERY 6 HOURS PRN
Status: DISCONTINUED | OUTPATIENT
Start: 2024-08-14 | End: 2024-08-15 | Stop reason: HOSPADM

## 2024-08-14 RX ORDER — MISOPROSTOL 200 UG/1
800 TABLET ORAL ONCE AS NEEDED
Status: DISCONTINUED | OUTPATIENT
Start: 2024-08-14 | End: 2024-08-15 | Stop reason: HOSPADM

## 2024-08-14 RX ORDER — METOCLOPRAMIDE 10 MG/1
10 TABLET ORAL EVERY 6 HOURS PRN
Status: DISCONTINUED | OUTPATIENT
Start: 2024-08-14 | End: 2024-08-15 | Stop reason: HOSPADM

## 2024-08-14 RX ORDER — CARBOPROST TROMETHAMINE 250 UG/ML
250 INJECTION, SOLUTION INTRAMUSCULAR ONCE AS NEEDED
Status: DISCONTINUED | OUTPATIENT
Start: 2024-08-14 | End: 2024-08-15 | Stop reason: HOSPADM

## 2024-08-14 RX ORDER — METHYLERGONOVINE MALEATE 0.2 MG/ML
0.2 INJECTION INTRAVENOUS ONCE AS NEEDED
Status: DISCONTINUED | OUTPATIENT
Start: 2024-08-14 | End: 2024-08-15 | Stop reason: HOSPADM

## 2024-08-14 RX ORDER — TOPIRAMATE 50 MG/1
50 TABLET, FILM COATED ORAL NIGHTLY
Status: DISCONTINUED | OUTPATIENT
Start: 2024-08-14 | End: 2024-08-15 | Stop reason: HOSPADM

## 2024-08-14 RX ORDER — ONDANSETRON 4 MG/1
4 TABLET, FILM COATED ORAL EVERY 6 HOURS PRN
Status: DISCONTINUED | OUTPATIENT
Start: 2024-08-14 | End: 2024-08-15 | Stop reason: HOSPADM

## 2024-08-14 RX ORDER — DULOXETIN HYDROCHLORIDE 60 MG/1
60 CAPSULE, DELAYED RELEASE ORAL DAILY
Status: DISCONTINUED | OUTPATIENT
Start: 2024-08-14 | End: 2024-08-15 | Stop reason: HOSPADM

## 2024-08-14 RX ORDER — SIMETHICONE 80 MG
80 TABLET,CHEWABLE ORAL 4 TIMES DAILY PRN
Status: DISCONTINUED | OUTPATIENT
Start: 2024-08-14 | End: 2024-08-15 | Stop reason: HOSPADM

## 2024-08-14 RX ORDER — LEVOTHYROXINE SODIUM 50 UG/1
50 TABLET ORAL DAILY
Status: DISCONTINUED | OUTPATIENT
Start: 2024-08-14 | End: 2024-08-15 | Stop reason: HOSPADM

## 2024-08-14 RX ORDER — HYDRALAZINE HYDROCHLORIDE 20 MG/ML
5 INJECTION INTRAMUSCULAR; INTRAVENOUS ONCE AS NEEDED
Status: DISCONTINUED | OUTPATIENT
Start: 2024-08-14 | End: 2024-08-15 | Stop reason: HOSPADM

## 2024-08-14 RX ORDER — INSULIN LISPRO 100 [IU]/ML
6 INJECTION, SOLUTION INTRAVENOUS; SUBCUTANEOUS ONCE
Status: DISCONTINUED | OUTPATIENT
Start: 2024-08-14 | End: 2024-08-15 | Stop reason: HOSPADM

## 2024-08-14 RX ORDER — DEXTROSE 40 %
15 GEL (GRAM) ORAL
Status: DISCONTINUED | OUTPATIENT
Start: 2024-08-14 | End: 2024-08-15 | Stop reason: HOSPADM

## 2024-08-14 RX ORDER — DEXTROSE 40 %
30 GEL (GRAM) ORAL
Status: DISCONTINUED | OUTPATIENT
Start: 2024-08-14 | End: 2024-08-15 | Stop reason: HOSPADM

## 2024-08-14 RX ADMIN — NIFEDIPINE 60 MG: 60 TABLET, FILM COATED, EXTENDED RELEASE ORAL at 06:43

## 2024-08-14 RX ADMIN — LEVOTHYROXINE SODIUM 50 MCG: 0.05 TABLET ORAL at 06:43

## 2024-08-14 RX ADMIN — PREGABALIN 300 MG: 75 CAPSULE ORAL at 10:21

## 2024-08-14 RX ADMIN — PREGABALIN 300 MG: 75 CAPSULE ORAL at 21:31

## 2024-08-14 RX ADMIN — TOPIRAMATE 50 MG: 50 TABLET, FILM COATED ORAL at 21:30

## 2024-08-14 RX ADMIN — DULOXETINE HYDROCHLORIDE 60 MG: 60 CAPSULE, DELAYED RELEASE ORAL at 10:22

## 2024-08-14 ASSESSMENT — PAIN SCALES - GENERAL
PAINLEVEL_OUTOF10: 0 - NO PAIN
PAINLEVEL_OUTOF10: 0 - NO PAIN
PAINLEVEL_OUTOF10: 1
PAINLEVEL_OUTOF10: 0 - NO PAIN

## 2024-08-14 ASSESSMENT — PAIN DESCRIPTION - DESCRIPTORS: DESCRIPTORS: HEADACHE

## 2024-08-14 ASSESSMENT — ACTIVITIES OF DAILY LIVING (ADL): LACK_OF_TRANSPORTATION: NO

## 2024-08-14 NOTE — CONSULTS
[facility]   [facility] [place]  INITIAL CONSULT NOTE     Consult requested by: Service: Obstetrics    Reason for Consult: Elevated liver enzymes    Admission Chief Complaint: SOB      SUBJECTIVE     HPI: Estefani Alvarenga is a 29 y.o. female  w/PMH of pre-eclampsia with severe features, hypothyroidism and T1DM who is admitted for SOB, dizziness and palpitations.     Ms Alvarenga complains of intermittent episodes of SOB and difficulty taking a breath for the past 1-2 weeks. She also endorses associated dizziness and palpitations. She had one episode of nausea and non bloody, non bilious vomiting a couple of days ago after eating soup. She otherwise denies fever, chills, abdominal pain, change in bowel movement, melena, hematochezia, confusion, dysphagia or new skin rashes/jaundice. She has not been taking any OTC medications/herbal supplements or antibiotics in the past few weeks. She has not been drinking etoh.     Today, she is afebrile and HDS with HR 70 and -120s/80s. Pertinent labs include BG 36, Na 141, Cr 0.6,  (peaked at 227),  (peaked at 367), T bili 0.5, Alk phos 104 (peaked at 126), Hgb 12.8, plt 325K, BNP normal and negative acute viral hepatitis panel. CT a/p revealed hepatomegaly.     ROS: Complete review of systems obtained, negative unless otherwise indicated above.     Allergies   Allergen Reactions    Shellfish Derived Unknown    Shellfish Derived Hives and Itching     Past Medical History:   Diagnosis Date    History of exploratory laparotomy     Personal history of other diseases of the circulatory system 2020    History of heart failure    Type 2 diabetes mellitus with hypoglycemia without coma (Multi) 2020    Controlled type 2 diabetes mellitus with hypoglycemia, unspecified whether long term insulin use     Past Surgical History:   Procedure Laterality Date    OTHER SURGICAL HISTORY  2020    Exploratory  laparoscopy     Family History   Problem Relation Name Age of Onset    Breast cancer Maternal Grandmother      Leukemia Maternal Grandmother       Social History     Social History Narrative    ** Merged History Encounter **            Medications:  Scheduled medications  acetaminophen, 975 mg, oral, q6h HINA  DULoxetine, 60 mg, oral, Daily  ibuprofen, 600 mg, oral, q6h HINA  levothyroxine, 50 mcg, oral, Daily  NIFEdipine ER, 60 mg, oral, Daily before breakfast  oxytocin, 600 arelis-units/min, intravenous, Once  pregabalin, 300 mg, oral, BID  topiramate, 50 mg, oral, Nightly      Continuous medications     PRN medications  PRN medications: benzocaine-menthoL, bisacodyl, carboprost, dextrose, diphenhydrAMINE **OR** diphenhydrAMINE, glucagon, glucose, glucose, hydrALAZINE, labetaloL, lanolin, lidocaine, loperamide, magnesium hydroxide, measles, mumps and rubella, methylergonovine, metoclopramide **OR** metoclopramide, miSOPROStoL, NIFEdipine, ondansetron **OR** ondansetron, oxytocin, oxytocin, oxytocin, oxytocin, polyethylene glycol, psyllium, sennosides-docusate sodium, simethicone, tranexamic acid, witch hazel       EXAM     Vital signs:  Vitals:    08/14/24 1201   BP:    Pulse: 90   Resp:    Temp:    SpO2: 95%         Physical Exam  Constitutional:       General: She is not in acute distress.  HENT:      Mouth/Throat:      Mouth: Mucous membranes are moist.   Eyes:      General: No scleral icterus.     Pupils: Pupils are equal, round, and reactive to light.   Cardiovascular:      Rate and Rhythm: Normal rate and regular rhythm.      Pulses: Normal pulses.   Pulmonary:      Effort: Pulmonary effort is normal.   Abdominal:      General: Abdomen is flat. There is no distension.      Palpations: Abdomen is soft.      Tenderness: There is no abdominal tenderness.   Musculoskeletal:      Right lower leg: No edema.      Left lower leg: No edema.   Skin:     General: Skin is warm.      Capillary Refill: Capillary refill takes  less than 2 seconds.   Neurological:      General: No focal deficit present.      Mental Status: She is alert and oriented to person, place, and time.   Psychiatric:         Mood and Affect: Mood normal.         Thought Content: Thought content normal.         Judgment: Judgment normal.             DATA                                                                            Labs     Lab Results   Component Value Date    WBC 4.5 08/13/2024    WBC 6.9 07/04/2024    WBC 6.1 07/03/2024    HGB 12.8 08/13/2024    HGB 12.5 07/04/2024    HGB 11.9 (L) 07/03/2024    MCV 89 08/13/2024    MCV 90 07/04/2024    MCV 87 07/03/2024     08/13/2024     07/04/2024     07/03/2024       Lab Results   Component Value Date    GLUCOSE 36 (LL) 08/14/2024    CALCIUM 8.8 08/14/2024     08/14/2024    K 3.6 08/14/2024    CO2 24 08/14/2024     08/14/2024    BUN 14 08/14/2024    CREATININE 0.61 08/14/2024       Lab Results   Component Value Date     (H) 08/14/2024     (H) 08/13/2024     (H) 08/13/2024     (H) 08/14/2024     (H) 08/13/2024     (H) 08/13/2024    ALKPHOS 104 08/14/2024    ALKPHOS 110 08/13/2024    ALKPHOS 126 (H) 08/13/2024    BILITOT 0.5 08/14/2024    BILITOT 0.4 08/13/2024    BILITOT 0.5 08/13/2024                                                                                  Imaging             === 08/13/24 ===    CT ABDOMEN PELVIS W IV CONTRAST    - Impression -  Hepatomegaly, otherwise unremarkable CT of the abdomen or pelvis.    I personally reviewed the images/study and I agree with the findings  as stated by resident Atul Madden. This study was interpreted  at [place], [place].    MACRO:  None    Signed by: Pia Costello 8/14/2024 12:48 AM  Dictation workstation:   [id number]                                                                           GI Procedures       ASSESSMENT / PLAN                   Assessment and Recommendations:   Estefani Alvarenga is a 29 y.o. female  w/PMH of pre-eclampsia with severe features, hypothyroidism and T1DM who is admitted for SOB, dizziness and palpitations.     #Elevated liver enzymes  Mr Alvarenga is admitted after presenting with SOB, dizziness and palpitations. Cardiopulmonary work up was negative. She was found to have hepatocellular liver injury with elevated AST/ALT peaking at ~300 but with normal T bili. Her liver enzymes have trended down since admission. Imaging study showed hepatomegaly. Patient is not known to have prior liver disease. Her liver enzymes were normal around the time she developed pre-eclampsia. Her liver synthetic function appears to be preserved (no INR checked but nl albumin). She has no e/o or portal hypertension.  Unclear etiology for elevated liver enzymes at this time. Does not appear to be 2/2 DILI, alcoholic or viral hepatitis, biliary disease or splanchnic thrombosis. Presentation with SOB and hepatomegaly on imaging can be concerning for postpartum cardiomyopathy, however patient exam does not show signs of volume overload and BNP is normal.     Recommendations:   - Recommend the following to rule out etiologies that could result in elevated liver enzymes from chronic liver disease (if not already ordered):  Hepatitis E IgM and IgG, A1AT phenotype, YAMILETH, Anti-Smooth Muscle Antibody, IgG serum, Anti-Mitochondrial Antibody, ceruloplasmin, iron studies and ferritin.  - Please obtain TTE to rule out cardiomyopathy   - Please order INR   - Continue to trend liver enzymes     KEITH per primary team.   ------------------------------------------------------------------------  Rolando Ivey MD   Gastroenterology Fellow    After 5PM and on Weekends, please page on-call fellow.    To be discussed with service attending Dr. Zamora  Final recommendations pending attending attestation.

## 2024-08-14 NOTE — SIGNIFICANT EVENT
Update in Plan - Transfer to Postpartum  SUBJECTIVE  Patient doing well with no HA, no vision changes, no RUQ pain. She has no CP, no SOB. Has no abdominal pain or vaginal bleeding.    OBJECTIVE  Visit Vitals  BP 93/55   Pulse 69   Temp 36.3 °C (97.3 °F)   Resp 18                    A&P    SOB  - Intermittent tachycardia, 120s on presentation, now 80-110s  - SpO2 93-97% on RA, afebrile  - Physical exam WNL  - BNP 5, troponin negative  - COVID negative  - Concern for PE: CT PE neg     T1DM  -  > 57, improved to 87 with apple juice  - pump on and on auto mode     siPEC w/ SF, elevated LFTs  - enalapril 10 mg and nifedipine 120 mg, patient took nifedipine 60 and enalapril 10 this am, will deescalate to nifedipine 60 alone in the morning.   - HELLP labs n/f elevated LFTS: AST//227 (14/8 on 7/4) > 285/215, follow up AM  - Hepatitis panel neg and amylase/lipase wnl  - enalapril associated with rare transaminates, will dc as above.  - Epigastric pain with deep inspiration  - CT abdomen/pelvis with mild hepatomegaly but other wise negative    Amine MD Brenda

## 2024-08-14 NOTE — CONSULTS
Reason For Consult  T1DM, postpartum period    History Of Present Illness  Estefani Alvarenga is a 29 y.o.  with T1DM now PPD #40 from a  c/b siPEC w/ SF admitted for elevated liver enzymes. Pt initially presented overnight with shortness of breath, however, was found to have an ALT and AST of 227 and 367, respectively, and was admitted to the OB service for further evaluation.    Yesterday evening Estefani had a CT Chest performed that showed no evidence of a pulmonary embolism. EKG showed sinus tachycardia but was otherwise normal. BNP was 5 pg/mL.    A CTAP was performed for elevated LFTs that showed hepatomegaly but was otherwise normal. GI was consulted. The OB team asked that the patient be transferred to the M service due to her hx of T1DM.    This morning Estefani is doing well. Her insulin pump initially was unable to provide boluses due to the battery being low, but her  brought in the . She had some episodes of hypoglycemia down into the 30s-40s that have since resolved. She denies headaches, visual changes or RUQ pain. No other concerns.    History per chart review:     Past Medical History  She has a past medical history of History of exploratory laparotomy, Personal history of other diseases of the circulatory system (2020), and Type 2 diabetes mellitus with hypoglycemia without coma (Multi) (2020).    Surgical History  She has a past surgical history that includes Other surgical history (2020).     Social History  She reports that she has never smoked. She has never used smokeless tobacco. She reports that she does not drink alcohol and does not use drugs.    Family History  Family History   Problem Relation Name Age of Onset    Breast cancer Maternal Grandmother      Leukemia Maternal Grandmother          Allergies  Shellfish derived and Shellfish derived    O: /72   Pulse 90   Temp 36 °C (96.8 °F) (Temporal)   Resp 18   LMP  (LMP Unknown) Comment: Pt  has been bleeding since delivery  SpO2 95%   Breastfeeding Yes   Gen: NAD  Resp: nonlabored breathing  Cardiac: good peripheral perfusion  Psych: appropriate mood and affect    A/P: Estefani Alvarenga is a 29 y.o.  with T1DM now PPD #40 from a  c/b siPEC w/ SF admitted for elevated liver enzymes.    *Elevated liver enzymes  -  and  on admission. ALT now 183,   - INR 0.9, Tbili 0.5  - CTAP with evidence of hepatomegaly, no other acute findings  - Hepatitis panel pending  - Hepatology consulted: recommend Hep E IgM and IgG, AFP, A1AT, YAMILETH, anti-SMA, anti-mitochondrial ab, ceruloplasmin, iron studies and ferritin as well as an echocardiogram  - will continue to trend    *KATTY with SF  - previously on enalapril 10 mg and nifedipine 120 mg daily  - decreased to nifedipine 60 mg daily overnight  - Bps now nml-range    *T1DM  - pump settings as below:  --Basal:  5136-0169 1 unit/hr  7578-7218 1.2 unit/hr  1946-5624 1 unit/hr  --CR:  8253-1939 1:18  8088-4041 1:10  7784-8631: 1:15  --ISF 1:20 --> will increase to 1:50  - will turn on reverse correction factor  - pt has automode on  - CGM no longer working  - plan for accuchecks fasting, pre and postprandial    *Hx of DKA with metabolic derangement and cardiogenic shock  - occurred in   - last echocardiogram 2024 showed a nml LVEF   - will get TTE    Dispo - will continue to trend labs    Enzo Moulton MD  Maternal-Fetal Medicine

## 2024-08-14 NOTE — PROGRESS NOTES
POSTPARTUM PROGRESS NOTE    SUBJECTIVE  Patient doing well with no HA, no vision changes, no RUQ pain. She has no CP, no SOB. Has no abdominal pain or vaginal bleeding.    OBJECTIVE  Visit Vitals  /75   Pulse 67   Temp 36.6 °C (97.9 °F) (Temporal)   Resp 16          Physical Examination  General: no acute distress  HEENT: normocephalic, atraumatic  Heart: warm and well perfused  Lungs: breathing comfortably on room air, O2 sat appropriate >92%  Abdomen: soft, gravid, nontender  Extremities: moving all extremities  Neuro: awake and conversant  Psych: appropriate mood and affect           A&P    SOB  - Intermittent tachycardia, 120s on presentation, now 80-110s this AM  - SpO2 93-97% on RA, afebrile  - Physical exam WNL  - BNP 5, troponin negative  - COVID negative  - Concern for PE: CT PE neg     T1DM  -  > 57, improved to 87 with apple juice initially on admission  - RN alert for drop in BG to 47, treating and will re-check POCT glucose  - pump on and on auto mode  - Plan for MFM consult to advise on pump settings to optimize euglycemia      siPEC w/ SF, elevated LFTs  - Pt initially on enalapril 10 mg and nifedipine 120 mg, patient took nifedipine 60 and enalapril 10 yesterday am, Pt regimen decreased to nifedipine 60 alone in the setting of hypotension on admission.  - HELLP labs n/f elevated LFTS: AST//227 (14/8 on 7/4) > 285/215, follow up AM  - Hepatitis panel neg and amylase/lipase wnl  - enalapril associated with rare transaminates, s/p dc on admission as above  - Epigastric pain with deep inspiration, improved this AM.  - CT abdomen/pelvis with mild hepatomegaly but other wise negative  - C/S MFM for additional recommendations in the postpartum period.    D/w Dr. Ron Valentino and Dr. Maya Davis MD PGY-4

## 2024-08-15 VITALS
TEMPERATURE: 97.5 F | DIASTOLIC BLOOD PRESSURE: 83 MMHG | HEART RATE: 79 BPM | SYSTOLIC BLOOD PRESSURE: 123 MMHG | RESPIRATION RATE: 16 BRPM | OXYGEN SATURATION: 96 %

## 2024-08-15 LAB
ALBUMIN SERPL BCP-MCNC: 3.5 G/DL (ref 3.4–5)
ALP SERPL-CCNC: 98 U/L (ref 33–110)
ALT SERPL W P-5'-P-CCNC: 137 U/L (ref 7–45)
ANION GAP SERPL CALC-SCNC: 13 MMOL/L (ref 10–20)
AST SERPL W P-5'-P-CCNC: 98 U/L (ref 9–39)
BILIRUB SERPL-MCNC: 0.5 MG/DL (ref 0–1.2)
BUN SERPL-MCNC: 9 MG/DL (ref 6–23)
CALCIUM SERPL-MCNC: 9 MG/DL (ref 8.6–10.6)
CHLORIDE SERPL-SCNC: 104 MMOL/L (ref 98–107)
CO2 SERPL-SCNC: 25 MMOL/L (ref 21–32)
CREAT SERPL-MCNC: 0.51 MG/DL (ref 0.5–1.05)
EGFRCR SERPLBLD CKD-EPI 2021: >90 ML/MIN/1.73M*2
GLUCOSE BLD MANUAL STRIP-MCNC: 105 MG/DL (ref 74–99)
GLUCOSE BLD MANUAL STRIP-MCNC: 110 MG/DL (ref 74–99)
GLUCOSE BLD MANUAL STRIP-MCNC: 58 MG/DL (ref 74–99)
GLUCOSE BLD MANUAL STRIP-MCNC: 85 MG/DL (ref 74–99)
GLUCOSE SERPL-MCNC: 111 MG/DL (ref 74–99)
POTASSIUM SERPL-SCNC: 4.1 MMOL/L (ref 3.5–5.3)
PROT SERPL-MCNC: 5.8 G/DL (ref 6.4–8.2)
SODIUM SERPL-SCNC: 138 MMOL/L (ref 136–145)

## 2024-08-15 PROCEDURE — G0378 HOSPITAL OBSERVATION PER HR: HCPCS

## 2024-08-15 PROCEDURE — 99231 SBSQ HOSP IP/OBS SF/LOW 25: CPT | Performed by: PATHOLOGY

## 2024-08-15 PROCEDURE — 36415 COLL VENOUS BLD VENIPUNCTURE: CPT

## 2024-08-15 PROCEDURE — 82374 ASSAY BLOOD CARBON DIOXIDE: CPT

## 2024-08-15 PROCEDURE — 2500000002 HC RX 250 W HCPCS SELF ADMINISTERED DRUGS (ALT 637 FOR MEDICARE OP, ALT 636 FOR OP/ED): Performed by: STUDENT IN AN ORGANIZED HEALTH CARE EDUCATION/TRAINING PROGRAM

## 2024-08-15 PROCEDURE — 82947 ASSAY GLUCOSE BLOOD QUANT: CPT

## 2024-08-15 PROCEDURE — 99238 HOSP IP/OBS DSCHRG MGMT 30/<: CPT | Performed by: STUDENT IN AN ORGANIZED HEALTH CARE EDUCATION/TRAINING PROGRAM

## 2024-08-15 PROCEDURE — 2500000001 HC RX 250 WO HCPCS SELF ADMINISTERED DRUGS (ALT 637 FOR MEDICARE OP)

## 2024-08-15 PROCEDURE — 2500000001 HC RX 250 WO HCPCS SELF ADMINISTERED DRUGS (ALT 637 FOR MEDICARE OP): Performed by: STUDENT IN AN ORGANIZED HEALTH CARE EDUCATION/TRAINING PROGRAM

## 2024-08-15 RX ORDER — NIFEDIPINE 60 MG/1
60 TABLET, FILM COATED, EXTENDED RELEASE ORAL
Qty: 60 TABLET | Refills: 0 | Status: SHIPPED | OUTPATIENT
Start: 2024-08-15 | End: 2024-10-14

## 2024-08-15 RX ORDER — INSULIN LISPRO 100 [IU]/ML
3 INJECTION, SOLUTION INTRAVENOUS; SUBCUTANEOUS AS NEEDED
Status: DISCONTINUED | OUTPATIENT
Start: 2024-08-15 | End: 2024-08-15 | Stop reason: HOSPADM

## 2024-08-15 RX ORDER — DEXTROSE 50 % IN WATER (D50W) INTRAVENOUS SYRINGE
25
Status: DISCONTINUED | OUTPATIENT
Start: 2024-08-15 | End: 2024-08-15 | Stop reason: HOSPADM

## 2024-08-15 RX ORDER — DEXTROSE 50 % IN WATER (D50W) INTRAVENOUS SYRINGE
12.5
Status: DISCONTINUED | OUTPATIENT
Start: 2024-08-15 | End: 2024-08-15 | Stop reason: HOSPADM

## 2024-08-15 RX ADMIN — NIFEDIPINE 60 MG: 60 TABLET, FILM COATED, EXTENDED RELEASE ORAL at 07:02

## 2024-08-15 RX ADMIN — LEVOTHYROXINE SODIUM 50 MCG: 0.05 TABLET ORAL at 07:02

## 2024-08-15 RX ADMIN — DULOXETINE HYDROCHLORIDE 60 MG: 60 CAPSULE, DELAYED RELEASE ORAL at 09:15

## 2024-08-15 RX ADMIN — PREGABALIN 300 MG: 75 CAPSULE ORAL at 09:15

## 2024-08-15 ASSESSMENT — PAIN SCALES - GENERAL: PAINLEVEL_OUTOF10: 0 - NO PAIN

## 2024-08-15 NOTE — PROGRESS NOTES
[facility]  [facility] [place]  CONSULT FOLLOW-UP         SUBJECTIVE     Reason for Consult: Elevated liver enzymes    Interval Events/Subjective:   -NAOE, pt afebrile and HDS  -Pt lying comfortably in bed. Has no complaints.  -Liver enzymes continue to improve  -INR is 0.9     ROS: Complete ROS obtained, negative unless otherwise indicated above.     Medications:  acetaminophen, 975 mg, oral, q6h HINA  DULoxetine, 60 mg, oral, Daily  ibuprofen, 600 mg, oral, q6h HINA  insulin lispro, 6 Units, subcutaneous, Once  levothyroxine, 50 mcg, oral, Daily  NIFEdipine ER, 60 mg, oral, Daily before breakfast  oxytocin, 600 arelis-units/min, intravenous, Once  pregabalin, 300 mg, oral, BID  topiramate, 50 mg, oral, Nightly      PRN medications: benzocaine-menthoL, bisacodyl, carboprost, dextrose, diphenhydrAMINE **OR** diphenhydrAMINE, glucagon, glucose, glucose, hydrALAZINE, labetaloL, lanolin, lidocaine, loperamide, magnesium hydroxide, measles, mumps and rubella, methylergonovine, metoclopramide **OR** metoclopramide, miSOPROStoL, NIFEdipine, ondansetron **OR** ondansetron, oxytocin, oxytocin, oxytocin, oxytocin, polyethylene glycol, psyllium, sennosides-docusate sodium, simethicone, tranexamic acid, witch hazel      EXAM     Physical Exam   Vitals:    08/15/24 1139   BP: 123/83   Pulse: 79   Resp: 16   Temp: 36.4 °C (97.5 °F)   SpO2: 96%         General: NAD, AA&O x 3  Eyes: EOMI, PERRLA  ENT: MMM  Heart: RRR  Lungs: No respiratory distress  Abdomen: Soft, non tender, non distended  Skin: No jaundice   Neuro: Appropriately responds to questions/commands         DATA                                                                            Labs     Lab Results   Component Value Date    WBC 4.5 08/13/2024    HGB 12.8 08/13/2024    HCT 40.1 08/13/2024    MCV 89 08/13/2024     08/13/2024     Lab Results   Component Value Date     (H) 08/15/2024    AST 98 (H) 08/15/2024     ALKPHOS 98 08/15/2024    BILITOT 0.5 08/15/2024     Lab Results   Component Value Date    INR 0.9 2024    INR 1.2 (H) 10/07/2020    INR 1.1 10/06/2020    PROTIME 9.7 (L) 2024    PROTIME 13.7 (H) 10/07/2020    PROTIME 13.4 (H) 10/06/2020     Lab Results   Component Value Date    GLUCOSE 111 (H) 08/15/2024    CALCIUM 9.0 08/15/2024     08/15/2024    K 4.1 08/15/2024    CO2 25 08/15/2024     08/15/2024    BUN 9 08/15/2024    CREATININE 0.51 08/15/2024                                                                                  Imaging           === 24 ===    CT ABDOMEN PELVIS W IV CONTRAST    - Impression -  Hepatomegaly, otherwise unremarkable CT of the abdomen or pelvis.    I personally reviewed the images/study and I agree with the findings  as stated by resident Atul Madden. This study was interpreted  at [facility], [place], [place].    MACRO:  None    Signed by: Pia Costello 2024 12:48 AM  Dictation workstation:   [id number]                                                                           GI Procedures           ASSESSMENT / PLAN     Assessment and Recommendations:   Estefani Alvarenga is a 29 y.o. female  w/PMH of pre-eclampsia with severe features, hypothyroidism and T1DM who is admitted for SOB, dizziness and palpitations.      #Elevated liver enzymes  Mr Alvarenga is admitted after presenting with SOB, dizziness and palpitations. Cardiopulmonary work up was negative. She was found to have hepatocellular liver injury with elevated AST/ALT peaking at ~300 but with normal T bili. Her liver enzymes have trended down since admission. Imaging study showed hepatomegaly. Patient is not known to have prior liver disease. Her liver enzymes were normal around the time she developed pre-eclampsia. Her liver synthetic function appears to be preserved (no INR checked but nl albumin). She has no e/o or portal  hypertension.  Unclear etiology for elevated liver enzymes at this time. Does not appear to be 2/2 DILI, alcoholic or viral hepatitis, biliary disease or splanchnic thrombosis. Presentation with SOB and hepatomegaly on imaging can be concerning for postpartum cardiomyopathy, however patient exam does not show signs of volume overload and BNP is normal.     Work up this admission includes TTE (8/14) with low normal left ventricular systolic function (EF 50-55%) and nl right ventricular global systolic function.      Recommendations:   - Recommend the following to rule out etiologies that could result in elevated liver enzymes from chronic liver disease (if not already ordered):  Hepatitis E IgM and IgG, A1AT phenotype, YAMILETH, Anti-Smooth Muscle Antibody, IgG serum, Anti-Mitochondrial Antibody, ceruloplasmin, iron studies and ferritin.  - Okay to discharge from hepatology perspective  - Please schedule follow up with outpatient hepatology (Dr Zamora)  - Please check liver enzymes outpatient in one week and another in one month.     KEITH per primary team     ------------------------------------------------------------------------  Rolando Ivey MD  Gastroenterology Fellow  After 5PM and on Weekends, please page on-call fellow.    To be discussed with service attending Dr. Zamora  Final recommendations pending attending attestation.

## 2024-08-15 NOTE — CARE PLAN
The patient's goals for the shift include rest    The clinical goals for the shift include wnl vs      Problem: Hypertensive Disorder of Pregnancy (HDP)  Goal: Minimal s/sx of HDP and BP<160/110  Outcome: Adequate for Discharge  Goal: Adequate urine output (0.5 ml/kg/hr)  Outcome: Adequate for Discharge     Problem: Pain - Adult  Goal: Verbalizes/displays adequate comfort level or baseline comfort level  Outcome: Adequate for Discharge     Problem: Safety - Adult  Goal: Free from fall injury  Outcome: Adequate for Discharge

## 2024-08-15 NOTE — DISCHARGE SUMMARY
Discharge Diagnosis  Preeclampsia in postpartum period ([facility])    Issues Requiring Follow-Up  Hepatology referral placed.    Test Results Pending At Discharge  Pending Labs       Order Current Status    YAMILETH with Reflex to DANIELLE In process    Anti-smooth muscle antibody, IgG In process    Antimitochondrial antibody In process    Hepatitis E IgM; [facility]; [facility] - Miscellaneous Test In process            Hospital Course  29 y.o.  with T1DM who delivered on  from a  c/b siPEC w/ SF who was admitted for elevated liver enzymes and shortness of breath.    On presentation, she reported shortness of breath intermittent for 1-1.5 weeks, now more frequent and occurring at rest and found to have elevated liver enzymes on workup. She underwent subsequent testing (BNP, troponin, COVID negative). Underwent CT PE which was negative for clot but showed mild hepatomegaly. She also underwent TTE for history of cardiomyopathy, which showed low normal EF of 50-55%. On HOD#2, she was asymptomatic.     Hepatology was consulted who recommended trending her LFTs and completing a liver workup including Hepatitis E IgM and IgG, A1AT phenotype, YAMILETH, Anti-Smooth Muscle Antibody, IgG serum, Anti-Mitochondrial Antibody, ceruloplasmin, iron studies and ferritin. The workup was drawn and most tests outstanding at the time of discharge.     Additionally, she was previously on enalapril 10 mg and nifedipine 120 mg daily, but was decreased to Nifedipine 60 daily and she was subsequently normotensive on this regimen.     She has T1DM with an insulin pump/CGM combo which was maintained this admission. Her pump settings were adjusted on presentation, settings as follows:  --Basal:  9772-2866 1 unit/hr  8523-0368 1.2 unit/hr  3344-8728 1 unit/hr  --CR:  0795-0176 1:18  9698-4148 1:10  9331-6145: 1:15  --ISF 1:50  --Reverse correction factor ON  - Continue Auto Mode    She was discharged on 8/15 with plans to follow up with Hepatology  "and repeat LFTs in 1 week and 1 month. She has postpartum follow up scheduled.     Pertinent Physical Exam At Time of Discharge  See progress note from 8/15.    Home Medications     Medication List      CHANGE how you take these medications     NIFEdipine ER 60 mg 24 hr tablet; Commonly known as: Adalat CC; Take 1   tablet (60 mg) by mouth once daily in the morning. Take before meals. Do   not crush, chew, or split.; What changed: when to take this     CONTINUE taking these medications     Accu-Chek Guide test strips strip; Generic drug: blood sugar diagnostic   acetaminophen 325 mg tablet; Commonly known as: Tylenol; Take 2 tablets   (650 mg) by mouth every 6 hours if needed for mild pain (1 - 3).   Advocate Blood Pressure Monitr kit; Generic drug: blood pressure test   kit-large; Use to test BP twice daily during pregnancy   Baqsimi 3 mg/actuation spray,non-aerosol; Generic drug: glucagon; Squirt   in to nose for tx of severe hypoglycemia.  Keep one at work, home and   travel   BD Yissel 2nd Gen Pen Needle 32 gauge x 5/32\" needle; Generic drug: pen   needle, diabetic   blood pressure monitor kit; Commonly known as: Blood Pressure Kit; 1   Application once daily.   Dexcom G6 Sensor device; Generic drug: blood-glucose sensor; Use 1   sensor and replace every 10 days.   * Dexcom G6 Transmitter device; Generic drug: blood-glucose transmitter   device; Use as instructed, replace every 90 days.   * Dexcom G6 Transmitter device; Generic drug: blood-glucose transmitter   device   * docusate sodium 100 mg capsule; Commonly known as: Colace   * docusate sodium 100 mg capsule; Commonly known as: Colace; Take 1   capsule (100 mg) by mouth 2 times a day as needed for constipation.   * DULoxetine 60 mg DR capsule; Commonly known as: Cymbalta; Take 1   capsule (60 mg) by mouth once daily. Do not crush or chew.   * DULoxetine 60 mg DR capsule; Commonly known as: Cymbalta   ergocalciferol 1.25 MG (72637 UT) capsule; Commonly known " as: Vitamin   D-2   fluticasone 50 mcg/actuation nasal spray; Commonly known as: Flonase;   Administer 1 spray into each nostril once daily. Shake gently. Before   first use, prime pump. After use, clean tip and replace cap.   HumaLOG U-100 Insulin 100 unit/mL injection; Generic drug: insulin   lispro; Use up to 75 units daily per insulin pump settings   ibuprofen 600 mg tablet; Take 1 tablet (600 mg) by mouth every 6 hours   if needed for mild pain (1 - 3).   * insulin glargine 100 unit/mL injection; Commonly known as: Lantus   * Lantus Solostar U-100 Insulin 100 unit/mL (3 mL) pen; Generic drug:   insulin glargine   Ketone Urine Test strip; Generic drug: acetone (urine) test; Test urine   when BGM sustained above 200, sick days and as advised by provider   * levothyroxine 50 mcg tablet; Commonly known as: Synthroid, Levoxyl;   Take 1 tablet daily   * levothyroxine 50 mcg tablet; Commonly known as: Synthroid, Levoxyl   * Lyumjev U-100 Insulin 100 unit/mL solution; Generic drug: insulin   lispro-aabc   * Lyumjev U-100 Insulin 100 unit/mL solution; Generic drug: insulin   lispro-aabc; Take up to 150 units per day in insulin pump   melatonin 10 mg tablet   * metoprolol tartrate 25 mg tablet; Commonly known as: Lopressor; Take   0.5 tablets (12.5 mg) by mouth 2 times a day.   * metoprolol tartrate 25 mg tablet; Commonly known as: Lopressor   norethindrone 0.35 mg tablet; Commonly known as: Micronor; Take 1 tablet   (0.35 mg) over 28 days by mouth once daily.   Omnipod 5 G6 Intro Kit (Gen 5) cartridge; Generic drug: insulin pump   cart,auto,BT-cntr   Omnipod 5 G6 Pods (Gen 5) cartridge; Generic drug: insulin pump   cart,automated,BT; Inject 1 each under the skin every other day. Change   pod every other day days as directed during pregnancy   ondansetron 4 mg tablet; Commonly known as: Zofran; Take 1 tablet (4 mg)   by mouth every 6 hours if needed for nausea or vomiting.   polyethylene glycol 17 gram packet; Commonly  known as: Glycolax,   Miralax; Take 17 g by mouth once daily as needed (constipation).   pregabalin 300 mg capsule; Commonly known as: Lyrica; Take 1 capsule   (300 mg) by mouth 2 times a day.   Semglee(insulin glarg-yfgn)Pen 100 unit/mL (3 mL) Pen; Generic drug:   insulin glargine-yfgn   thiamine 100 mg tablet; Commonly known as: Vitamin B-1   topiramate 50 mg tablet; Commonly known as: Topamax; Take 1 tablet in   the morning and 2 tablets at bedtime daily.  * This list has 14 medication(s) that are the same as other medications   prescribed for you. Read the directions carefully, and ask your doctor or   other care provider to review them with you.     STOP taking these medications     aspirin 81 mg EC tablet   enalapril 10 mg tablet; Commonly known as: Vasotec       Outpatient Follow-Up  Future Appointments   Date Time Provider Department Center   8/29/2024  1:30 PM Mark Cee MD [id number]   8/29/2024  2:30 PM Luciano Bustos MD [id number] [facility]   10/8/2024  1:00 PM Tyron Reid MD [facility]     Discussed with Dr. Moulton.    Sherri Slater MD, PGY-2  MFM

## 2024-08-15 NOTE — PROGRESS NOTES
ANTEPARTUM PROGRESS NOTE   8/15/2024, 1:01 PM     SUBJECTIVE:  No acute events overnight. Patient has no complaints this morning.  Denies HA, vision changes, CP, SOB, RUQ or epigastric pain    OBJECTIVE:   /83   Pulse 79   Temp 36.4 °C (97.5 °F)   Resp 16   LMP  (LMP Unknown) Comment: Pt has been bleeding since delivery  SpO2 96%   Breastfeeding Yes    Temp  Min: 36 °C (96.8 °F)  Max: 36.7 °C (98.1 °F)  Pulse  Min: 73  Max: 88  BP  Min: 107/68  Max: 123/83    General: no acute distress  HEENT: normocephalic, atraumatic  Heart: warm and well perfused  Lungs: breathing comfortably on room air  Extremities: moving all extremities spontaneously  Neuro: awake and conversant  Psych: appropriate mood and affect     Labs:   Labs in chart were reviewed.  Lab Results   Component Value Date    GLUCOSE 111 (H) 08/15/2024     08/15/2024    K 4.1 08/15/2024     08/15/2024    CO2 25 08/15/2024    ANIONGAP 13 08/15/2024    BUN 9 08/15/2024    CREATININE 0.51 08/15/2024    EGFR >90 08/15/2024    CALCIUM 9.0 08/15/2024    ALBUMIN 3.5 08/15/2024    PROT 5.8 (L) 08/15/2024    ALKPHOS 98 08/15/2024     (H) 08/15/2024    AST 98 (H) 08/15/2024    BILITOT 0.5 08/15/2024       ASSESSMENT AND PLAN:     29 y.o.  with T1DM now approx 1 month postpartum from a  c/b [facility] w/ SF admitted for elevated liver enzymes.    Elevated Liver Enzymes   and  on admission. Downtrending, currently  AST 98. INR 0.9, Tbili 0.5. CTAP with evidence of hepatomegaly, no other acute findings. Currently undergoing hepatology workup, labs in process, thus far nml. Continues to be asymptomatic.  - Hepatology consulted, appreciate recs     [facility] w/SF  Previously on enalapril 10 mg and nifedipine 120 mg daily. Down-titrated to nifedipine 60 mg daily. BP now normotensive.  - Continue Nifed 60mg daily     T1DM  Has CGM and omnipod duo. Currently CGM is not online due to power outage. Patient has supplies at  home. Pump settings adjusted while inpatient.  Settings as below:  --Basal:  1368-0049 1 unit/hr  3031-6168 1.2 unit/hr  1590-4398 1 unit/hr  --CR:  2806-5287 1:18  6310-5175 1:10  6343-7664: 1:15  --ISF 1:50  --Reverse correction factor ON  - Continue Auto Mode  - Continue fasting, pre and postprandial POCT     Hx of DKA with metabolic derangement and cardiogenic shock  Occurred in 2020,  last echocardiogram 4/2024 showed a nml LVEF. Intermittent shortness of breath on admission now resolved.  - TTE 8/14 grossly wnl, EF 50-55%    Dispo: Likely discharge pending GI recs    Pt seen and discussed with M Attending, Dr. Moulton.     Sherri Slater MD   [facility] PGY-2  Pager [phone]     Principal Problem:    Preeclampsia in postpartum period ([facility])

## 2024-08-15 NOTE — SIGNIFICANT EVENT
BP cuff and Home Monitoring  Patient meets criteria for home monitoring of blood pressure post discharge.  Reason:si current preeclampsia with severe features,. Met with patient to assess for availability of home BP monitor.  Patient stated she owns home BP monitor. . Patient educated on importance of continuing to monitor BP at home, recording BP on home monitoring log and s/sx of when to call her provider.  Pt verbalized understanding the above information.

## 2024-08-15 NOTE — HOSPITAL COURSE
29 y.o.  with T1DM who delivered on  from a  c/b siPEC w/ SF who was admitted for elevated liver enzymes and shortness of breath.    On presentation, she reported shortness of breath intermittent for 1-1.5 weeks, now more frequent and occurring at rest and found to have elevated liver enzymes on workup. She underwent subsequent testing (BNP, troponin, COVID negative). Underwent CT PE which was negative for clot but showed mild hepatomegaly. She also underwent TTE for history of cardiomyopathy, which showed low normal EF of 50-55%. On HOD#2, she was asymptomatic.     Hepatology was consulted who recommended trending her LFTs and completing a liver workup including Hepatitis E IgM and IgG, A1AT phenotype, YAMILETH, Anti-Smooth Muscle Antibody, IgG serum, Anti-Mitochondrial Antibody, ceruloplasmin, iron studies and ferritin. The workup was drawn and most tests outstanding at the time of discharge.     Additionally, she was previously on enalapril 10 mg and nifedipine 120 mg daily, but was decreased to Nifedipine 60 daily and she was subsequently normotensive on this regimen.     She has T1DM with an insulin pump/CGM combo which was maintained this admission. Her pump settings were adjusted on presentation, settings as follows:  --Basal:  3990-3877 1 unit/hr  6497-5842 1.2 unit/hr  9442-4078 1 unit/hr  --CR:  2160-5363 1:18  2084-8358 1:10  8827-7044: 1:15  --ISF 1:50  --Reverse correction factor ON  - Continue Auto Mode    She was discharged on 8/15 with plans to follow up with Hepatology and repeat LFTs in 1 week and 1 month. She has postpartum follow up scheduled.

## 2024-08-15 NOTE — DISCHARGE INSTRUCTIONS
Please follow up with hepatology for your liver care. A referral was sent for you.  You will need to repeat your liver labs next week (8/22) and next month (9/15). You can complete these labs at any  lab location.

## 2024-08-19 LAB
ANA SER QL HEP2 SUBST: NEGATIVE
MITOCHONDRIA AB SER QL IF: NEGATIVE

## 2024-08-20 LAB — SMOOTH MUSCLE AB SER QL IF: ABNORMAL

## 2024-08-28 PROBLEM — R74.01 TRANSAMINITIS: Status: ACTIVE | Noted: 2024-08-28

## 2024-08-28 PROBLEM — O23.42 URINARY TRACT INFECTION IN MOTHER DURING SECOND TRIMESTER OF PREGNANCY (HHS-HCC): Status: RESOLVED | Noted: 2024-03-18 | Resolved: 2024-08-28

## 2024-08-28 PROBLEM — O14.93 PREECLAMPSIA, THIRD TRIMESTER (HHS-HCC): Status: RESOLVED | Noted: 2024-07-03 | Resolved: 2024-08-28

## 2024-08-28 PROBLEM — E03.9 HYPOTHYROIDISM: Status: RESOLVED | Noted: 2024-07-03 | Resolved: 2024-08-28

## 2024-08-28 PROBLEM — O14.93: Status: RESOLVED | Noted: 2024-07-05 | Resolved: 2024-08-28

## 2024-08-28 NOTE — ASSESSMENT & PLAN NOTE
- Bps 130s/80s at home and in office, recommend continue with nifedipine 60mg daily.   - for f/u with PCP for continue management or adjustments.

## 2024-08-28 NOTE — ASSESSMENT & PLAN NOTE
- meeting postpartum milestones , doing well  - mood good, has visit scheduled with Dr. Bustos   [facility]   - Feeding: exclusive breastfeeding

## 2024-08-28 NOTE — ASSESSMENT & PLAN NOTE
Current reg on hospital discharge:   --Basal:  9153-3939 1 unit/hr  4841-4602 1.2 unit/hr  3925-5645 1 unit/hr  --CR:  8117-0518 1:18  3431-7204 1:10  1925-9496: 1:15  --ISF 1:50    - reports sugars are well controlled, some elevated postprandials to 180s-200s. Fastings appropriate.   - referral to endocrinology , pt has endocrinologist she is planning to follow with.

## 2024-08-29 ENCOUNTER — OFFICE VISIT (OUTPATIENT)
Dept: MATERNAL FETAL MEDICINE | Facility: CLINIC | Age: 29
End: 2024-08-29
Payer: COMMERCIAL

## 2024-08-29 VITALS
HEIGHT: 63 IN | BODY MASS INDEX: 29.57 KG/M2 | WEIGHT: 166.9 LBS | DIASTOLIC BLOOD PRESSURE: 86 MMHG | HEART RATE: 83 BPM | SYSTOLIC BLOOD PRESSURE: 133 MMHG

## 2024-08-29 DIAGNOSIS — M54.10 POLYRADICULOPATHY: ICD-10-CM

## 2024-08-29 DIAGNOSIS — Z86.79 HISTORY OF CARDIOMYOPATHY: ICD-10-CM

## 2024-08-29 DIAGNOSIS — O99.342 ANXIETY IN PREGNANCY IN SECOND TRIMESTER, ANTEPARTUM (HHS-HCC): ICD-10-CM

## 2024-08-29 DIAGNOSIS — Z36.5 SCREENING, ANTENATAL, ISOIMMUNIZATION (HHS-HCC): ICD-10-CM

## 2024-08-29 DIAGNOSIS — O99.282 HYPOTHYROIDISM AFFECTING PREGNANCY IN SECOND TRIMESTER (HHS-HCC): ICD-10-CM

## 2024-08-29 DIAGNOSIS — E03.9 HYPOTHYROIDISM AFFECTING PREGNANCY IN SECOND TRIMESTER (HHS-HCC): ICD-10-CM

## 2024-08-29 DIAGNOSIS — R74.01 TRANSAMINITIS: ICD-10-CM

## 2024-08-29 DIAGNOSIS — F41.9 ANXIETY IN PREGNANCY IN SECOND TRIMESTER, ANTEPARTUM (HHS-HCC): ICD-10-CM

## 2024-08-29 DIAGNOSIS — G61.0 GUILLAIN BARRÉ SYNDROME (MULTI): ICD-10-CM

## 2024-08-29 LAB — GLUCOSE BLD MANUAL STRIP-MCNC: 147 MG/DL (ref 74–99)

## 2024-08-29 PROCEDURE — 99215 OFFICE O/P EST HI 40 MIN: CPT | Mod: GC | Performed by: OBSTETRICS & GYNECOLOGY

## 2024-08-29 PROCEDURE — 82947 ASSAY GLUCOSE BLOOD QUANT: CPT | Performed by: OBSTETRICS & GYNECOLOGY

## 2024-08-29 ASSESSMENT — PATIENT HEALTH QUESTIONNAIRE - PHQ9
SUM OF ALL RESPONSES TO PHQ9 QUESTIONS 1 AND 2: 0
1. LITTLE INTEREST OR PLEASURE IN DOING THINGS: NOT AT ALL
2. FEELING DOWN, DEPRESSED OR HOPELESS: NOT AT ALL

## 2024-08-29 ASSESSMENT — PAIN SCALES - GENERAL: PAINLEVEL: 0-NO PAIN

## 2024-08-29 ASSESSMENT — COLUMBIA-SUICIDE SEVERITY RATING SCALE - C-SSRS
2. HAVE YOU ACTUALLY HAD ANY THOUGHTS OF KILLING YOURSELF?: NO
1. IN THE PAST MONTH, HAVE YOU WISHED YOU WERE DEAD OR WISHED YOU COULD GO TO SLEEP AND NOT WAKE UP?: NO
6. HAVE YOU EVER DONE ANYTHING, STARTED TO DO ANYTHING, OR PREPARED TO DO ANYTHING TO END YOUR LIFE?: NO

## 2024-08-29 NOTE — PROGRESS NOTES
"[facility] Follow-up  2024        SUBJECTIVE    HPI: Estefani Alvarenga is a 29 y.o.  now postpartum from  2024     siPEC w SF - reports home Bps are normotensive 110s-130s/70s-80s. Nifedipine 60 daily.  Had been taking metoprolol during pregnancy and stopped with delivery. Reports not previously being on regular BP agent.    T1DM - reports BG control is good now.  Has endocrinologist she is planning to see, needs to schedule. Same pump settings as prior hospital discharge.  Fastings - more typically in 80s-90s, occasionally lows to 50s though rare  Postprandials - regularly in 200s after eating  Symptomatic hypoglycemia <80    Postpartum - bleeding resolved. Exclusive breastfeeding, pumping. Mood good, some anxiety, feels this is better than she has been in the past.. Has appointment scheduled with Dr. Bustos today. Well supported at home with . Taking OCPs.     Pap 2022 NILM   Surg path reviewed.       Other pregnancy complications include      Patient Active Problem List   Diagnosis    Hypothyroidism affecting pregnancy in second trimester ([facility])    Polyradiculopathy    Pre-existing type 1 diabetes mellitus during pregnancy, postpartum ([facility])    Postpartum exam ([facility])    Benign essential hypertension, postpartum ([facility])    History of cardiomyopathy    Screening, , isoimmunization ([facility])    Guillain Barré syndrome ([facility])    Anxiety in pregnancy in second trimester, antepartum ([facility])    Diabetes mellitus type 1 ([facility])    Hyperlipidemia    Neuromuscular disease ([facility])    Preeclampsia in postpartum period ([facility])    Transaminitis       OBJECTIVE  Visit Vitals  /86   Pulse 83   Ht 1.6 m (5' 3\")   Wt 75.7 kg (166 lb 14.4 oz)   LMP  (LMP Unknown) Comment: Pt has been bleeding since delivery   Breastfeeding Yes Comment: birth control pills   BMI 29.57 kg/m²   OB Status Recent pregnancy   Smoking Status Never   BSA 1.83 m²      General: no acute distress  Heart: warm " and well perfused, breastfeeding  Lungs: no increased WOB  Extremities: moving all extremities     ASSESSMENT & PLAN    Estefani Alvarenga is a 29 y.o.  at 35w2d here for the following concerns we addressed today:    Pre-existing type 1 diabetes mellitus during pregnancy, postpartum ([facility])  Current reg on hospital discharge:   --Basal:  5060-1459 1 unit/hr  3750-2141 1.2 unit/hr  6508-4462 1 unit/hr  --CR:  4491-3309 1:18  2322-0728 1:10  8537-2394: 1:15  --ISF 1:50    - reports sugars are well controlled, some elevated postprandials to 180s-200s. Fastings appropriate.   - referral to endocrinology , pt has endocrinologist she is planning to follow with.     Postpartum exam ([facility])  - meeting postpartum milestones , doing well  - mood good, has visit scheduled with Dr. Bustos   [facility]   - Feeding: exclusive breastfeeding    Preeclampsia in postpartum period ([facility])  - Bps 130s/80s at home and in office, recommend continue with nifedipine 60mg daily.   - for f/u with PCP for continue management or adjustments.     Hypothyroidism affecting pregnancy in second trimester ([facility])  - continue synthroid 50mcg daily   - for endocrinology follow-up outpatient.     Transaminitis  - +anti-smooth muscle Ab  - Hep E pending. Workup otherwise neg.   - FUV scheduled with GI 10/3      No orders of the defined types were placed in this encounter.       Patient seen and evaluated with Dr. Jaye Mccrary MD

## 2024-08-30 LAB — SCAN RESULT: NORMAL

## 2024-09-19 ENCOUNTER — TELEPHONE (OUTPATIENT)
Dept: MATERNAL FETAL MEDICINE | Facility: HOSPITAL | Age: 29
End: 2024-09-19
Payer: COMMERCIAL

## 2024-09-19 NOTE — TELEPHONE ENCOUNTER
This NP called patient to gently remind to schedule with Endocrine and offer assistance if needed.   No answer, left voicemail offering to assist with scheduling and Endocrine scheduling phone number ([phone]) if patient would like to schedule on own.   Will continue to be available for PP coordination if needed.      Kimberlee Pa CNP  Complex/High Risk OB   [phone]

## 2024-10-03 ENCOUNTER — OFFICE VISIT (OUTPATIENT)
Dept: GASTROENTEROLOGY | Facility: CLINIC | Age: 29
End: 2024-10-03
Payer: COMMERCIAL

## 2024-10-03 VITALS
SYSTOLIC BLOOD PRESSURE: 112 MMHG | HEART RATE: 112 BPM | WEIGHT: 163 LBS | HEIGHT: 63 IN | TEMPERATURE: 98.3 F | BODY MASS INDEX: 28.88 KG/M2 | DIASTOLIC BLOOD PRESSURE: 78 MMHG

## 2024-10-03 DIAGNOSIS — R74.8 ELEVATED LIVER ENZYMES: ICD-10-CM

## 2024-10-03 PROCEDURE — 99214 OFFICE O/P EST MOD 30 MIN: CPT | Performed by: NURSE PRACTITIONER

## 2024-10-03 PROCEDURE — 3044F HG A1C LEVEL LT 7.0%: CPT | Performed by: NURSE PRACTITIONER

## 2024-10-03 PROCEDURE — 3074F SYST BP LT 130 MM HG: CPT | Performed by: NURSE PRACTITIONER

## 2024-10-03 PROCEDURE — 3008F BODY MASS INDEX DOCD: CPT | Performed by: NURSE PRACTITIONER

## 2024-10-03 PROCEDURE — 3062F POS MACROALBUMINURIA REV: CPT | Performed by: NURSE PRACTITIONER

## 2024-10-03 PROCEDURE — 3078F DIAST BP <80 MM HG: CPT | Performed by: NURSE PRACTITIONER

## 2024-10-03 PROCEDURE — 1036F TOBACCO NON-USER: CPT | Performed by: NURSE PRACTITIONER

## 2024-10-03 ASSESSMENT — ENCOUNTER SYMPTOMS
TROUBLE SWALLOWING: 0
HEADACHES: 0
VOMITING: 0
PALPITATIONS: 0
DYSURIA: 0
APPETITE CHANGE: 0
COUGH: 0
ABDOMINAL DISTENTION: 0
FEVER: 0
WHEEZING: 0
NUMBNESS: 0
FREQUENCY: 0
CONSTIPATION: 0
VOICE CHANGE: 0
ABDOMINAL PAIN: 0
LIGHT-HEADEDNESS: 0
BRUISES/BLEEDS EASILY: 0
NAUSEA: 0
CONFUSION: 0
DIARRHEA: 0
SLEEP DISTURBANCE: 0
DIFFICULTY URINATING: 0
CHILLS: 0
COLOR CHANGE: 0
BLOOD IN STOOL: 0
AGITATION: 0
DIZZINESS: 0
JOINT SWELLING: 0
SHORTNESS OF BREATH: 0
WEAKNESS: 0
HEMATURIA: 0
UNEXPECTED WEIGHT CHANGE: 0
TREMORS: 0

## 2024-10-03 NOTE — ASSESSMENT & PLAN NOTE
29 year old with history of elevated liver enzymes in the setting of T1DM, hypothyroidism and recent admission for SOB.  Initial liver disease work-up negative for viral hepatitis or autoimmune liver disease.  There is suspicion that this could be related to mild post-partum cardiomyopathy, though imaging and echo were unremarkable.  She feels very well, no symptoms or issues.   For now, will plan to repeat labs and follow along.  If labs are not back to normal, may need to consider additional work-up which could include liver biopsy.  Will review and call pts with results.

## 2024-10-03 NOTE — PROGRESS NOTES
Patient ID: Estefani Alvarenga is a 29 y.o. female who presents for elevated liver enzymes.    HPI  29 year old with history of type 1 DM,  hypothyroidism with history of elevated liver enzymes after being admitted for SOB, dizziness and palpitations.  She has a history of pre-eclampsia with recent delivery 5 weeks early of a healthy baby girl.     ~ 40 days following her delivery, she was admitted for shortness of breath and found to have elevated liver enzymes, which peaked around 300, normal bilirubin  No previous known liver disease.     Initial work-up was negative for viral hepatitis, DILI or alcohol induced liver disease.  Her ASMA was mildly elevated at 1:20.  YAMILETH/AMA negative.    A1AT negative.    Overall she feels very well.  In fact, she feels better even before she got pregnant.  Her blood sugars have been stable.    She has previous history of DKA with metabolic derangement and cardiogenic shock in 2020.  Recent echo normal.    Denies jaundice, icterus, itching, abdominal distension, edema, bleeding or confusion.    Denies fevers, chills, abdominal pain.       Review of Systems   Constitutional:  Negative for appetite change, chills, fever and unexpected weight change.   HENT:  Negative for mouth sores, nosebleeds, trouble swallowing and voice change.    Eyes:  Negative for visual disturbance.   Respiratory:  Negative for cough, shortness of breath and wheezing.    Cardiovascular:  Negative for chest pain, palpitations and leg swelling.   Gastrointestinal:  Negative for abdominal distention, abdominal pain, blood in stool, constipation, diarrhea, nausea and vomiting.   Genitourinary:  Negative for decreased urine volume, difficulty urinating, dysuria, frequency, hematuria and urgency.   Musculoskeletal:  Negative for gait problem and joint swelling.   Skin:  Negative for color change, pallor and rash.   Neurological:  Negative for dizziness, tremors, weakness, light-headedness, numbness and headaches.    Hematological:  Does not bruise/bleed easily.   Psychiatric/Behavioral:  Negative for agitation, behavioral problems, confusion and sleep disturbance.        Objective   Physical Exam  Constitutional:       General: She is awake.      Appearance: Normal appearance. She is well-developed.   HENT:      Head: Normocephalic and atraumatic.      Right Ear: Hearing normal.      Left Ear: Hearing normal.      Nose: Nose normal.      Mouth/Throat:      Lips: Pink.      Mouth: Mucous membranes are moist.   Eyes:      General: Lids are normal.      Extraocular Movements: Extraocular movements intact.      Conjunctiva/sclera: Conjunctivae normal.      Pupils: Pupils are equal, round, and reactive to light.   Cardiovascular:      Rate and Rhythm: Normal rate and regular rhythm.      Pulses: Normal pulses.      Heart sounds: Normal heart sounds.   Pulmonary:      Effort: Pulmonary effort is normal.      Breath sounds: Normal breath sounds.   Abdominal:      General: Abdomen is flat. Bowel sounds are normal.      Palpations: Abdomen is soft.   Musculoskeletal:      Cervical back: Normal range of motion and neck supple.   Feet:      Right foot:      Skin integrity: Skin integrity normal.      Left foot:      Skin integrity: Skin integrity normal.   Skin:     General: Skin is warm.   Neurological:      General: No focal deficit present.      Mental Status: She is alert and oriented to person, place, and time.      Cranial Nerves: Cranial nerves 2-12 are intact.      Sensory: Sensation is intact.      Motor: Motor function is intact.      Coordination: Coordination is intact.      Gait: Gait is intact.   Psychiatric:         Attention and Perception: Attention and perception normal.         Mood and Affect: Mood normal.         Speech: Speech normal.         Behavior: Behavior is cooperative.         Thought Content: Thought content normal.         Cognition and Memory: Cognition normal.         Judgment: Judgment normal.  "        Current Outpatient Medications   Medication Sig Dispense Refill    acetone, urine, test (Ketone Urine Test) strip Test urine when BGM sustained above 200, sick days and as advised by provider 1 strip 3    BD Yissel 2nd Gen Pen Needle 32 gauge x 5/32\" needle       blood pressure test kit-large kit Use to test BP twice daily during pregnancy 1 each 0    blood sugar diagnostic (Accu-Chek Guide test strips) strip 4 times a day.      Dexcom G6 Sensor device Use 1 sensor and replace every 10 days. 3 each 11    Dexcom G6 Transmitter device Use as instructed, replace every 90 days. 1 each 3    docusate sodium (Colace) 100 mg capsule Take 1 capsule (100 mg) by mouth 2 times a day as needed for constipation. 30 capsule 5    DULoxetine (Cymbalta) 60 mg DR capsule Take 1 capsule (60 mg) by mouth once daily.      ergocalciferol (Vitamin D-2) 1.25 MG (40954 UT) capsule Take by mouth.      fluticasone (Flonase) 50 mcg/actuation nasal spray Administer 1 spray into each nostril once daily. Shake gently. Before first use, prime pump. After use, clean tip and replace cap. 16 g 12    glucagon (Baqsimi) 3 mg/actuation spray,non-aerosol Squirt in to nose for tx of severe hypoglycemia.  Keep one at work, home and travel 2 each 3    HumaLOG U-100 Insulin 100 unit/mL injection Use up to 75 units daily per insulin pump settings 10 mL 11    insulin glargine (Lantus) 100 unit/mL injection Inject under the skin.      insulin lispro-aabc (Lyumjev U-100 Insulin) 100 unit/mL solution Take up to 150 units per day in insulin pump 45 mL 11    insulin pump cart,automated,BT (Omnipod 5 G6 Pods, Gen 5,) cartridge Inject 1 each under the skin every other day. Change pod every other day days as directed during pregnancy 15 each 11    levothyroxine (Synthroid, Levoxyl) 50 mcg tablet Take 1 tablet (50 mcg) by mouth early in the morning..      Lyumjev U-100 Insulin 100 unit/mL solution       melatonin 10 mg tablet Take 1 tablet (10 mg) by mouth once " daily.      NIFEdipine ER (Adalat CC) 60 mg 24 hr tablet Take 1 tablet (60 mg) by mouth once daily in the morning. Take before meals. Do not crush, chew, or split. 60 tablet 0    norethindrone (Micronor) 0.35 mg tablet Take 1 tablet (0.35 mg) over 28 days by mouth once daily. 30 tablet 12    ondansetron (Zofran) 4 mg tablet Take 1 tablet (4 mg) by mouth every 6 hours if needed for nausea or vomiting. 20 tablet 0    polyethylene glycol (Glycolax, Miralax) 17 gram packet Take 17 g by mouth once daily as needed (constipation). 30 packet 2    pregabalin (Lyrica) 300 mg capsule Take 1 capsule (300 mg) by mouth 2 times a day. 60 capsule 5    thiamine 100 mg tablet Take by mouth.      topiramate (Topamax) 50 mg tablet Take 1 tablet in the morning and 2 tablets at bedtime daily. 90 tablet 11     No current facility-administered medications for this visit.     LABS:   Lab Results   Component Value Date    ALBUMIN 3.5 08/15/2024    BILITOT 0.5 08/15/2024    BILIDIR 0.3 09/30/2020    ALKPHOS 98 08/15/2024     (H) 08/15/2024    AST 98 (H) 08/15/2024    PROT 5.8 (L) 08/15/2024    F2QHGNFHPTL 141 08/14/2024    AFP 7 08/14/2024    YAMILETH Negative 08/14/2024    MITOAB Negative 08/14/2024    CERULOPLSM 35.8 08/14/2024    ASMAB Positive 1:20 (A) 08/14/2024    HEPBSAG Nonreactive 08/13/2024    HEPCAB Nonreactive 08/13/2024    INR 0.9 08/14/2024    FERRITIN 136 08/14/2024    IRON 78 08/14/2024    IRONSAT 28 08/14/2024      Lab Results   Component Value Date    ALBUMIN 3.5 08/15/2024    BILITOT 0.5 08/15/2024    BILIDIR 0.3 09/30/2020    ALKPHOS 98 08/15/2024     (H) 08/15/2024    AST 98 (H) 08/15/2024    PROT 5.8 (L) 08/15/2024      Lab Results   Component Value Date    WBC 4.5 08/13/2024    HGB 12.8 08/13/2024    HCT 40.1 08/13/2024    MCV 89 08/13/2024     08/13/2024       === 08/13/24 ===    CT ABDOMEN PELVIS W IV CONTRAST    - Impression -  Hepatomegaly, otherwise unremarkable CT of the abdomen or pelvis.    I  personally reviewed the images/study and I agree with the findings  as stated by resident Atul Madden. This study was interpreted  at [facility], [place], [place].    MACRO:  None    Signed by: Pia Costello 8/14/2024 12:48 AM  Dictation workstation:   [id number]        Assessment/Plan   Problem List Items Addressed This Visit             ICD-10-CM    Elevated liver enzymes R74.8     29 year old with history of elevated liver enzymes in the setting of T1DM, hypothyroidism and recent admission for SOB.  Initial liver disease work-up negative for viral hepatitis or autoimmune liver disease.  There is suspicion that this could be related to mild post-partum cardiomyopathy, though imaging and echo were unremarkable.  She feels very well, no symptoms or issues.   For now, will plan to repeat labs and follow along.  If labs are not back to normal, may need to consider additional work-up which could include liver biopsy.  Will review and call pts with results.          Relevant Orders    Immunoglobulins (IgG, IgA, IgM)    YAMILETH with Reflex to DANIELLE    Anti-Smooth Muscle Antibody    CBC and Auto Differential    Comprehensive Metabolic Panel            Rayna Carroll, KALIA-CNP 10/03/24 5:13 PM

## 2025-01-17 ENCOUNTER — TELEPHONE (OUTPATIENT)
Dept: OBSTETRICS AND GYNECOLOGY | Facility: CLINIC | Age: 30
End: 2025-01-17
Payer: COMMERCIAL

## 2025-01-17 NOTE — TELEPHONE ENCOUNTER
LVM returning call Pt lvm stating she was not aware or such referral . Octavio explaining the referral and if she's still interested give us a call   Caitlin Miles

## 2025-03-11 NOTE — PROGRESS NOTES
Nutrition Assessment     Reason for Visit:  Estefani Alvarenga is a 29 y.o. female seen 3/14/24 for nutrition consult; Type 1 diabetes.    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     Energy Needs  *Tracking prenatal weight gain is the recommended method of determining adequacy of caloric intake.*    1st trimester EER = Nonpregnant EER + 0 kcal   2nd trimester EER = Nonpregnant EER + 340 kcal ( 400 kcal if underweight pre-pregnancy)  3rd trimester EER = Nonpregnant EER + 452 kcal  (600 kcal if underweight)    Nutrition Diagnosis      Nutrition Diagnosis  Patient has Nutrition Diagnosis: Yes  Nutrition Diagnosis 1: Increased nutrient needs  Related to (1): pregnancy  As Evidenced by (1): current pregnancy at 19 weeks gestation    Nutrition Interventions/Recommendations   The following snack ideas discussed with and emailed to Estefani:    Kind Bars  https://www.[employer]/ip/KIND-Gluten-Free-Dark-Chocolate-Nuts-Sea-Salt-Snack-Qauj-9-7-oz-6-Count/572480422?loigpx=&from=/search   https://www.[employer].us/en/products/breakfast-cereals/energy-granola-bars/detail/ps/p/elevation-fruit-and-nut-bars-assorted-varieties/  - budget friendly option at [facility]   https://www.[employer]/p/almond-peanuts-38-sea-salt-nut-bars-4ct-good-36-pohlcw-4799/-/A-75760016#lnk=sametab  - option at [employer]    Whole grain cheese or PB cracker sandwiches:   https://www.[employer]/ip/Gyfli-Knxtnjni-Hdscqyeu-Made-with-Whole-Grain-Crackers-Cheddar-Cheese-8-Packs-0-Syigousqzv-Ifom/54676659?from=/search     Triscuits or Wheat Thins with peanut butter - [employer] has lots of Triscuit flavors  https://www.target.com/s?searchTerm=triscuits&tref=typeahead%7Cterm%7Ctriscuits%7C%7C%7Chistory&oo=y&splchkoff=true     Triscuit Thins with  nuts/seeds  https://www.[employer]/p/triscuit-thin-crisps-whole-grain-wheat-vegan-crackers-7-1oz/-/A-21555090#lnk=sametab     Popcorn with nuts/seeds    Sunchips with PB or nuts    Chicken packet with chips or Triscuits    Try 100% whole grain bread for your sandwiches      Nutrition Monitoring and Evaluation   Biochemical Data, Medical Tests and Procedures  Monitoring and Evaluation Plan: Glucose/endocrine profile  Glucose/Endocrine Profile: Glucose, fasting, Other (Comment)  Criteria: Fasting BG 95 or less; 1hr post-meal  or less. Blood sugars will be submitted by pt to [facility] for review weekly. Follow-up as needed to assess goal attainment. Modifications based on further assessment and self-blood glucose monitoring results. Pt expresses understanding and agreement.

## 2025-04-04 ENCOUNTER — APPOINTMENT (OUTPATIENT)
Dept: ENDOCRINOLOGY | Facility: CLINIC | Age: 30
End: 2025-04-04
Payer: COMMERCIAL

## 2025-04-04 VITALS
WEIGHT: 176.8 LBS | HEIGHT: 63 IN | SYSTOLIC BLOOD PRESSURE: 111 MMHG | TEMPERATURE: 98 F | HEART RATE: 79 BPM | BODY MASS INDEX: 31.33 KG/M2 | DIASTOLIC BLOOD PRESSURE: 75 MMHG

## 2025-04-04 DIAGNOSIS — E10.59 TYPE 1 DIABETES MELLITUS WITH OTHER CIRCULATORY COMPLICATIONS: ICD-10-CM

## 2025-04-04 DIAGNOSIS — E10.65 TYPE 1 DIABETES MELLITUS WITH HYPERGLYCEMIA (MULTI): Primary | ICD-10-CM

## 2025-04-04 DIAGNOSIS — G70.9 NEUROMUSCULAR DISEASE (MULTI): ICD-10-CM

## 2025-04-04 DIAGNOSIS — E78.2 MIXED HYPERLIPIDEMIA: ICD-10-CM

## 2025-04-04 DIAGNOSIS — E10.9 TYPE 1 DIABETES MELLITUS WITHOUT COMPLICATION: ICD-10-CM

## 2025-04-04 DIAGNOSIS — Z46.81 INSULIN PUMP FITTING OR ADJUSTMENT: ICD-10-CM

## 2025-04-04 LAB — POC HEMOGLOBIN A1C: 8.7 % (ref 4.2–6.5)

## 2025-04-04 PROCEDURE — 3008F BODY MASS INDEX DOCD: CPT | Performed by: NURSE PRACTITIONER

## 2025-04-04 PROCEDURE — 95249 CONT GLUC MNTR PT PROV EQP: CPT | Performed by: NURSE PRACTITIONER

## 2025-04-04 PROCEDURE — 3078F DIAST BP <80 MM HG: CPT | Performed by: NURSE PRACTITIONER

## 2025-04-04 PROCEDURE — 92229 IMG RTA DETC/MNTR DS POC ALY: CPT | Performed by: NURSE PRACTITIONER

## 2025-04-04 PROCEDURE — 3074F SYST BP LT 130 MM HG: CPT | Performed by: NURSE PRACTITIONER

## 2025-04-04 PROCEDURE — 83036 HEMOGLOBIN GLYCOSYLATED A1C: CPT | Performed by: NURSE PRACTITIONER

## 2025-04-04 PROCEDURE — 99205 OFFICE O/P NEW HI 60 MIN: CPT | Performed by: NURSE PRACTITIONER

## 2025-04-04 PROCEDURE — 2033F EYE IMG VALID W/O RTNOPTHY: CPT | Performed by: NURSE PRACTITIONER

## 2025-04-04 PROCEDURE — 1036F TOBACCO NON-USER: CPT | Performed by: NURSE PRACTITIONER

## 2025-04-04 RX ORDER — INSULIN LISPRO-AABC 100 [IU]/ML
INJECTION, SOLUTION INTRAVENOUS; SUBCUTANEOUS
Qty: 45 ML | Refills: 11 | Status: SHIPPED | OUTPATIENT
Start: 2025-04-04

## 2025-04-04 RX ORDER — DEXTROSE 4 G
TABLET,CHEWABLE ORAL
Qty: 1 EACH | Refills: 0 | Status: SHIPPED | OUTPATIENT
Start: 2025-04-04

## 2025-04-04 RX ORDER — INSULIN LISPRO-AABC 100 [IU]/ML
INJECTION, SOLUTION INTRAVENOUS; SUBCUTANEOUS
Qty: 90 ML | Refills: 3 | Status: SHIPPED | OUTPATIENT
Start: 2025-04-04

## 2025-04-04 RX ORDER — INSULIN PMP CART,AUT,G6/7,CNTR
1 EACH SUBCUTANEOUS
Qty: 30 EACH | Refills: 3 | Status: SHIPPED | OUTPATIENT
Start: 2025-04-04

## 2025-04-04 ASSESSMENT — ENCOUNTER SYMPTOMS
FREQUENCY: 0
DIZZINESS: 0
CONSTIPATION: 0
POLYPHAGIA: 0
ACTIVITY CHANGE: 0
NUMBNESS: 0
LOSS OF SENSATION IN FEET: 1
FATIGUE: 0
PALPITATIONS: 0
DEPRESSION: 1
APPETITE CHANGE: 0
POLYDIPSIA: 0
SEIZURES: 0
NERVOUS/ANXIOUS: 0
WEAKNESS: 0
SLEEP DISTURBANCE: 0
OCCASIONAL FEELINGS OF UNSTEADINESS: 1
SHORTNESS OF BREATH: 0
NAUSEA: 0
DIARRHEA: 0

## 2025-04-04 ASSESSMENT — PAIN SCALES - GENERAL: PAINLEVEL_OUTOF10: 4

## 2025-04-04 ASSESSMENT — PATIENT HEALTH QUESTIONNAIRE - PHQ9
1. LITTLE INTEREST OR PLEASURE IN DOING THINGS: NOT AT ALL
2. FEELING DOWN, DEPRESSED OR HOPELESS: NOT AT ALL
SUM OF ALL RESPONSES TO PHQ9 QUESTIONS 1 AND 2: 0

## 2025-04-04 NOTE — PROGRESS NOTES
Subjective   Estefani Alvarenga is a 29 y.o. female here today for a new patient visit regarding Type 1 diabetes.     Initial diagnosis with diabetes was 2020 with an episode of severe DKA with a 2 months admission.  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 Is not currently followed by neurology as she is asymptomatic.     The patient does not have a family history of type 1    Patient delivered a baby at 35 weeks gestation July 2024 due to pre-eclampsia. Followed by Dr Kimani Munoz.     Known complications include: none    Previously seeing Dr Munoz for diabetes care.    A1c 8.7% in office today, but she has not had access to CGM for several months.  Previous A1c 6.4% May 2024    Current diabetes regimen is as follows:   OmniPod 5 with Dexcom     Patient is using continuous glucose monitor- She has a G7 with her in the office today, but does not have OmniPods that connect to G7. She has not used G7 as she states she does not know how.   The patient is currently checking the blood glucose 4 times per day .    Hypoglycemia frequency: States she has 2 hypos a week that she is able to self treat.   Hypoglycemia awareness: Yes    Regarding symptoms of hyperglycemia, the patient is not experiencing symptoms such as polydipsia, polyuria, and nocturia. .      Last foot exam: None. Denies issues at today's visit.   Last eye exam: >2 years  Last urine albumin: Update labs ordered today.   Last LDL: total 242  Nov 2023. Updated labs ordered.     Review of Systems   Constitutional:  Negative for activity change, appetite change and fatigue.   Respiratory:  Negative for shortness of breath.    Cardiovascular:  Negative for chest pain, palpitations and leg swelling.   Gastrointestinal:  Negative for constipation, diarrhea and nausea.   Endocrine: Negative for cold intolerance, heat intolerance, polydipsia, polyphagia and polyuria.   Genitourinary:  Negative for frequency.   Musculoskeletal:  Negative for  "gait problem.   Skin:  Negative for rash.   Neurological:  Negative for dizziness, seizures, weakness and numbness.   Psychiatric/Behavioral:  Negative for sleep disturbance and suicidal ideas. The patient is not nervous/anxious.       Objective    Blood pressure 111/75, pulse 79, temperature 36.7 °C (98 °F), temperature source Oral, height 1.6 m (5' 3\"), weight 80.2 kg (176 lb 12.8 oz), currently breastfeeding.  Physical Exam  Vitals and nursing note reviewed.   HENT:      Head: Atraumatic.   Pulmonary:      Effort: Pulmonary effort is normal.   Musculoskeletal:      Comments: Uses cane for stability with ambulation   Skin:     General: Skin is warm.   Neurological:      General: No focal deficit present.      Mental Status: She is alert and oriented to person, place, and time. Mental status is at baseline.      Gait: Gait abnormal.   Psychiatric:         Mood and Affect: Mood normal.         Behavior: Behavior normal.         Thought Content: Thought content normal.         Judgment: Judgment normal.           Lab Results   Component Value Date    BILITOT 0.5 08/15/2024    CALCIUM 9.0 08/15/2024    CO2 25 08/15/2024     08/15/2024    CREATININE 0.51 08/15/2024    GLUCOSE 111 (H) 08/15/2024    ALKPHOS 98 08/15/2024    K 4.1 08/15/2024    PROT 5.8 (L) 08/15/2024     08/15/2024    AST 98 (H) 08/15/2024     (H) 08/15/2024    BUN 9 08/15/2024    ANIONGAP 13 08/15/2024    MG 1.82 02/26/2024    PHOS 4.7 10/15/2020     (H) 09/30/2020    ALBUMIN 3.5 08/15/2024    AMYLASE 43 08/13/2024    LIPASE 47 08/13/2024     Lab Results   Component Value Date    TRIG 112 11/04/2023    CHOL 242 (H) 11/04/2023    LDLCALC 176 (H) 11/04/2023    HDL 44.0 11/04/2023     Lab Results   Component Value Date    HGBA1C 6.4 (H) 05/28/2024    HGBA1C 6.0 (H) 05/14/2024    HGBA1C 7.4 (H) 01/30/2024       The ASCVD Risk score (Kunal DK, et al., 2019) failed to calculate for the following reasons:    The 2019 ASCVD risk score " is only valid for ages 40 to 79    FIB-4 Calculation: 0.75 at 8/15/2024  7:21 AM  Calculated from:  SGOT/AST: 98 U/L at 8/15/2024  7:21 AM  SGPT/ALT: 137 U/L at 8/15/2024  7:21 AM  Platelets: 325 x10*3/uL at 8/13/2024  4:49 PM  Age: 29 years    Assessment/Plan   Problem List Items Addressed This Visit       Hyperlipidemia     Other Visit Diagnoses       Type 1 diabetes mellitus with hyperglycemia (Multi)    -  Primary    Relevant Medications    insulin pump cart,auto,BT,G6/7 (Omnipod 5 G6-G7 Pods, Gen 5,) cartridge    blood-glucose meter misc    Other Relevant Orders    Tissue transglutaminase, IgA    Lipid panel    Albumin-Creatinine Ratio, Urine Random    Diabetic Retinopathy Luminetics    TSH    Insulin pump fitting or adjustment              Type 1 diabetes mellitus with A1C 8.9% in office today.   RX changes:   Restart G6 today and use until you have G6/G7 pods, which were ordered today.   We have reconnected you to  Diabetes and will have you return in 2 weeks to see [facility] for pump/sensor download so we can make treatment decisions.   Referred to [facility] for support and follow up. Please let your patient know to expect a phone call from our team at [phone] within 24-48 hours (except on weekends).  Hypothyroid: Updated labs ordered  Hyperlipidemia: Still breastfeeding: we cannot use statin at this time.   Eye exam: performed in office today.   Education:  interpretation of lab results, blood sugar goals, and hypoglycemia prevention and treatment  Follow up: I recommend diabetes care be with [facility] in 2 weeks then myself at my next available.

## 2025-04-04 NOTE — PATIENT INSTRUCTIONS
Type 1 diabetes mellitus  RX changes:   Restart G6 today and use until you have G6/G7 pods, which were ordered today.   We have reconnected you to  Diabetes and will have you [facility] so we can make treatment decisions.   Referred to [facility] for support and follow up. Please let your patient know to expect a phone call from our team at [phone] within 24-48 hours (except on weekends).  Hypothyroid: Updated labs ordered  Hyperlipidemia: Still breastfeeding: we cannot use statin at this time.   Eye exam: performed in office today.   Education:  interpretation of lab results, blood sugar goals, and hypoglycemia prevention and treatment  Follow up: I recommend diabetes care be with [facility] in 2 weeks then myself at my next available.

## 2025-04-10 LAB
CHOLEST SERPL-MCNC: 213 MG/DL
CHOLEST/HDLC SERPL: 4 (CALC)
HDLC SERPL-MCNC: 53 MG/DL
LDLC SERPL CALC-MCNC: 146 MG/DL (CALC)
NONHDLC SERPL-MCNC: 160 MG/DL (CALC)
THYROGLOB AB SERPL-ACNC: <1 IU/ML
THYROPEROXIDASE AB SERPL-ACNC: <1 IU/ML
TRIGL SERPL-MCNC: 51 MG/DL
TSH SERPL-ACNC: 4.75 MIU/L
TTG IGA SER-ACNC: <1 U/ML

## 2025-04-17 ENCOUNTER — APPOINTMENT (OUTPATIENT)
Dept: ENDOCRINOLOGY | Facility: CLINIC | Age: 30
End: 2025-04-17
Payer: COMMERCIAL

## 2025-04-18 ENCOUNTER — NUTRITION (OUTPATIENT)
Dept: ENDOCRINOLOGY | Facility: CLINIC | Age: 30
End: 2025-04-18
Payer: COMMERCIAL

## 2025-04-18 DIAGNOSIS — E10.65 TYPE 1 DIABETES MELLITUS WITH HYPERGLYCEMIA (MULTI): ICD-10-CM

## 2025-04-18 PROCEDURE — 95251 CONT GLUC MNTR ANALYSIS I&R: CPT | Performed by: NURSE PRACTITIONER

## 2025-04-18 RX ORDER — PEN NEEDLE, DIABETIC 32GX 5/32"
NEEDLE, DISPOSABLE MISCELLANEOUS
Qty: 50 EACH | Refills: 1 | Status: SHIPPED | OUTPATIENT
Start: 2025-04-18

## 2025-04-18 RX ORDER — INSULIN GLARGINE 100 [IU]/ML
20 INJECTION, SOLUTION SUBCUTANEOUS NIGHTLY
Qty: 18 ML | Refills: 3 | Status: SHIPPED | OUTPATIENT
Start: 2025-04-18 | End: 2026-04-18

## 2025-04-18 NOTE — PROGRESS NOTES
Diabetes Education:    04/18/25  KRISHNA MATHIS     Referred by: Maggi MOTTA CNP     Reason for visit: Estefani Alvarenga is a 29 y.o. female who presents for initial DSME visit. Currently manages diabetes with Omni Pod 5 and Dexcom G6.     HPI: Type of Diabetes: Type of Diabetes: Type 1 without complications Initial diagnosis with diabetes was 2020 with an episode of severe DKA with a 2 months admission.  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 Is not currently followed by neurology as she is asymptomatic.     Past Medical History:  She has a past medical history of History of exploratory laparotomy, Hypothyroidism (07/03/2024), Personal history of other diseases of the circulatory system (12/21/2020), and Type 2 diabetes mellitus with hypoglycemia without coma (12/21/2020).    Allergies:  Shellfish derived and Shellfish derived    Social History:  She reports that she has never smoked. She has been exposed to tobacco smoke. She has never used smokeless tobacco. She reports current alcohol use. She reports that she does not use drugs.    Family History:  Family History[1]    Last Recorded Vitals:  BP Readings from Last 6 Encounters:   04/04/25 111/75   10/08/24 (!) 127/95   10/03/24 112/78   08/29/24 133/86   08/15/24 123/83   08/13/24 94/63        Wt Readings from Last 6 Encounters:   04/04/25 80.2 kg (176 lb 12.8 oz)   10/08/24 73.9 kg (163 lb)   10/03/24 73.9 kg (163 lb)   08/29/24 75.7 kg (166 lb 14.4 oz)   06/28/24 93.3 kg (205 lb 9.6 oz)   06/25/24 88.5 kg (195 lb)       Current medications: reviewed and updated with patient      Lab Review  Lab Results   Component Value Date    BILITOT 0.5 08/15/2024    CALCIUM 9.0 08/15/2024    CO2 25 08/15/2024     08/15/2024    CREATININE 0.51 08/15/2024    GLUCOSE 111 (H) 08/15/2024    ALKPHOS 98 08/15/2024    K 4.1 08/15/2024    PROT 5.8 (L) 08/15/2024     08/15/2024    AST 98 (H) 08/15/2024     (H) 08/15/2024    BUN 9  "08/15/2024    ANIONGAP 13 08/15/2024    MG 1.82 02/26/2024    PHOS 4.7 10/15/2020     (H) 09/30/2020    ALBUMIN 3.5 08/15/2024    AMYLASE 43 08/13/2024    LIPASE 47 08/13/2024     Lab Results   Component Value Date    TRIG 51 04/08/2025    CHOL 213 (H) 04/08/2025    LDLCALC 146 (H) 04/08/2025    HDL 53 04/08/2025     Lab Results   Component Value Date    HGBA1C 8.7 (A) 04/04/2025    HGBA1C 6.4 (H) 05/28/2024    HGBA1C 6.0 (H) 05/14/2024     No components found for: \"UACR\"  The ASCVD Risk score (Kunal LYNCH, et al., 2019) failed to calculate for the following reasons:    The 2019 ASCVD risk score is only valid for ages 40 to 79    Current diet:  CHO counts  Current exercise:  no formal exercise currently    Reproductive/Currently Pregnant : No.  Do you use birth control? : Yes  Are you planning to become pregnant in the future? : No  Have you reached Menopause? : No    Omni Pod/ Dexcom uploaded and reviewed with patient.               Current settings:       DSMT:  Reviewed goals for glucose levels:  Recommended glucose goals per ADA Guidelines:  Fasting and before meals:  mg/dL  1-2 hour after start of meal: <180 mg/dL    Recommended HbA1c goal per ADA Guidelines:  < 7% without the burden of hypoglycemia     Discussed getting food bolus before meals/ snacks.  Discussed correcting between meals and following pump recommendations.    Discussed other possible pump options that give automated bolus when necessary, including Tandem and Twiist.    Assessment:  Glucose levels throughout the day consistently hyperglycemic- even when taking bolus prior to meal.  Pump and CGM not always connected.   Late or missed meal bolus frequently.    Plan:   Adjusting settings as follows:  ICR   12 AM 15  6 AM    8  5 PM   12  ISF 40    No change to basal rates at this time.    Patient will work on getting bolus prior to meals. If misses prior, will correct when she realizes.  Patient will correct between meals when glucose " levels are elevated.   Patient will allow pump to calculate doses and not change dosing recommended by pump.    Patient will consider changing devices. Will continue to discuss at next visit.     Estefani Alvarenga agrees with plan and verbalizes understanding of all concepts discussed.  She was able to make recommended changes to dosing without verbal cueing.     Follow up in 4-6 weeks for additional DSMT.    Kathi DYER    I spent 60 minutes today with patient. This includes face to face as well as chart review and charting/ notes.               [1]   Family History  Problem Relation Name Age of Onset    Breast cancer Maternal Grandmother      Leukemia Maternal Grandmother

## 2025-04-23 NOTE — PATIENT INSTRUCTIONS
Plan:   Adjusting settings as follows:  ICR   12 AM 15  6 AM    8  5 PM   12  ISF 40     No change to basal rates at this time.     Work on getting bolus prior to meals. If misses prior, will correct when she realizes.  Correct between meals when glucose levels are elevated.   Allow pump to calculate doses and not change dosing recommended by pump.

## 2025-05-21 DIAGNOSIS — E10.65 TYPE 1 DIABETES MELLITUS WITH HYPERGLYCEMIA (MULTI): ICD-10-CM

## 2025-05-21 RX ORDER — INSULIN PMP CART,AUT,G6/7,CNTR
1 EACH SUBCUTANEOUS
Qty: 30 EACH | Refills: 3 | Status: SHIPPED | OUTPATIENT
Start: 2025-05-21

## 2025-06-03 ENCOUNTER — APPOINTMENT (OUTPATIENT)
Dept: ENDOCRINOLOGY | Facility: CLINIC | Age: 30
End: 2025-06-03
Payer: COMMERCIAL

## 2025-06-03 DIAGNOSIS — E10.65 TYPE 1 DIABETES MELLITUS WITH HYPERGLYCEMIA (MULTI): Primary | ICD-10-CM

## 2025-06-03 DIAGNOSIS — Z46.81 INSULIN PUMP FITTING OR ADJUSTMENT: ICD-10-CM

## 2025-06-03 PROCEDURE — 95251 CONT GLUC MNTR ANALYSIS I&R: CPT | Performed by: NURSE PRACTITIONER

## 2025-06-03 NOTE — PROGRESS NOTES
06/03/25  KRISHNA MATHIS      Reason for visit: Estefani Alvarenga is a 29 y.o. female who presents for follow up DSME visit. Currently using Omni Pod 5. Needs to learn how to use Dexcom G7 (had been using G6).    HPI: Type of Diabetes: Type of Diabetes: Type 1 without complications  Initial diagnosis with diabetes was 2020 with an episode of severe DKA with a 2 months admission. States she was also diagnosed with Guillain-Greig at that time. Is not currently followed by neurology as she is asymptomatic.     Past Medical History:  She has a past medical history of History of exploratory laparotomy, Hypothyroidism (07/03/2024), Personal history of other diseases of the circulatory system (12/21/2020), and Type 2 diabetes mellitus with hypoglycemia without coma (12/21/2020).    Allergies:  Shellfish derived and Shellfish derived    Social History:  She reports that she has never smoked. She has been exposed to tobacco smoke. She has never used smokeless tobacco. She reports current alcohol use. She reports that she does not use drugs.    Family History:  Family History[1]      Last Recorded Vitals:  BP Readings from Last 6 Encounters:   04/04/25 111/75   10/08/24 (!) 127/95   10/03/24 112/78   08/29/24 133/86   08/15/24 123/83   08/13/24 94/63        Wt Readings from Last 6 Encounters:   04/04/25 80.2 kg (176 lb 12.8 oz)   10/08/24 73.9 kg (163 lb)   10/03/24 73.9 kg (163 lb)   08/29/24 75.7 kg (166 lb 14.4 oz)   06/28/24 93.3 kg (205 lb 9.6 oz)   06/25/24 88.5 kg (195 lb)       Current medications: reviewed and updated with patient      Lab Review  Lab Results   Component Value Date    BILITOT 0.5 08/15/2024    CALCIUM 9.0 08/15/2024    CO2 25 08/15/2024     08/15/2024    CREATININE 0.51 08/15/2024    GLUCOSE 111 (H) 08/15/2024    ALKPHOS 98 08/15/2024    K 4.1 08/15/2024    PROT 5.8 (L) 08/15/2024     08/15/2024    AST 98 (H) 08/15/2024     (H) 08/15/2024    BUN 9 08/15/2024    ANIONGAP 13 08/15/2024  "   MG 1.82 02/26/2024    PHOS 4.7 10/15/2020     (H) 09/30/2020    ALBUMIN 3.5 08/15/2024    AMYLASE 43 08/13/2024    LIPASE 47 08/13/2024     Lab Results   Component Value Date    TRIG 51 04/08/2025    CHOL 213 (H) 04/08/2025    LDLCALC 146 (H) 04/08/2025    HDL 53 04/08/2025     Lab Results   Component Value Date    HGBA1C 8.7 (A) 04/04/2025    HGBA1C 6.4 (H) 05/28/2024    HGBA1C 6.0 (H) 05/14/2024     No components found for: \"UACR\"  The ASCVD Risk score (Kunal LYNCH, et al., 2019) failed to calculate for the following reasons:    The 2019 ASCVD risk score is only valid for ages 40 to 79    Current diet: CHO counts  Current exercise: no regular exercise    Dexcom G7 training and initiaton:  Patient will utilize the Omni Pod 5 reciever to obtain data.  Patient was able to download and set up the adriel on their phone.  Patient already has Clarity account on her phone as well.     Provided education on the following  Frequency of sensor changes every 10 days  Availability of replacement sensors through [employer] [phone] at no cost to pt in events of malfunction or falling off early.   Need to keep  or cell phone within 20 feet so glucose levels are recorded  Discussed difference between serum glucose and blood glucose levels  Reviewed how to calibrate if necessary/ available (Dexcom only)  Discussed what to do if patient symptoms are different from sensor data (check fingerstick blood sugar)  Reviewed how to enter data when looking at glucose reading such as food, exercise, medication  Discussed that Dexcom Clarity does NOT alert provider to readings. Pt needs to call or send my chart message if pt needs provider to look at glucose readings  Reviewed data that is available through adriel and target goals  Avg glucoses  Time in range  Sensor capture rate    Omni Pod upload reviewed:  Patient was having challenges with her Dexcom G6 and was out of sensors for a few days.         Current dosing:      Diabetes " Education:  Reviewed back up plan in the event of pump failure.   Lantus 30 units  Humalog before meals only (no correcting within 4 hours of last dose)  ISF   12 am - 9 pm;  25  9 pm - 12 am: 50  Target: 110  ICR:   12 am 12  5 am    8  10 am  7  5 pm    8    Contact Omni Pod directly for pump replacement.    Reviewed what to do if sensor is not worn-   Continue to wear pod.  Enter FSBG manually as well as CHOs.  Pump will still calculate doses for you.  Replace sensor ASAP.    Contact [employer] for replacement sensors if they continue to fall off.     Assessment:  TIR increased from 54% to 63% since last visit.   More consistently putting bolus in prior to meal.     No sensor currently in use- ran out of G6 and switching to G7.  Patient was able to insert sensor with verbal cueing per  instructions. Patient tolerated well.    Patient agrees with benefits of following pump calculations- she notes less hypoglycemia.    Plan:  No changes made to dosing at this time.  Patient agrees to continue entering CHOs and glucose levels from CGM more consistently. Feels her ICR are ok for now.     Contact [employer] [phone] for replacement device in the event of malfunction or sensor falling off early.     Patient was provided time to ask questions. All questions were answered and patient verbalized understanding.      Kathi DIEZ CDCES    I spent 60 minutes today with patient. This includes face to face as well as chart review and charting/ notes.               [1]   Family History  Problem Relation Name Age of Onset    Breast cancer Maternal Grandmother      Leukemia Maternal Grandmother

## 2025-06-03 NOTE — PATIENT INSTRUCTIONS
Diabetes Education:  Reviewed back up plan in the event of pump failure.   Lantus 30 units  Humalog before meals only (no correcting within 4 hours of last dose)  ISF   12 am - 9 pm;  25  9 pm - 12 am: 50  Target: 110  ICR:   12 am 12  5 am    8  10 am  7  5 pm    8     Contact Omni Pod directly for pump replacement.     Reviewed what to do if sensor is not worn-   Continue to wear pod.  Enter FSBG manually as well as CHOs.  Pump will still calculate doses for you.  Replace sensor ASAP.     Contact [employer] for replacement sensors if they continue to fall off.

## 2025-06-06 DIAGNOSIS — E10.44 DIABETIC AMYOTROPHY ASSOCIATED WITH TYPE 1 DIABETES MELLITUS: ICD-10-CM

## 2025-06-06 DIAGNOSIS — M54.10 POLYRADICULOPATHY: ICD-10-CM

## 2025-06-06 RX ORDER — PREGABALIN 300 MG/1
CAPSULE ORAL
Qty: 60 CAPSULE | Refills: 3 | Status: SHIPPED | OUTPATIENT
Start: 2025-06-06 | End: 2025-07-06

## 2025-07-15 ENCOUNTER — APPOINTMENT (OUTPATIENT)
Dept: ENDOCRINOLOGY | Facility: CLINIC | Age: 30
End: 2025-07-15
Payer: COMMERCIAL

## 2025-07-15 DIAGNOSIS — E10.65 TYPE 1 DIABETES MELLITUS WITH HYPERGLYCEMIA (MULTI): Primary | ICD-10-CM

## 2025-07-15 PROCEDURE — 95251 CONT GLUC MNTR ANALYSIS I&R: CPT | Performed by: NURSE PRACTITIONER

## 2025-07-15 NOTE — PROGRESS NOTES
07/15/25  KRISHNA MATHIS     Diabetes Education     Reason for visit: Estefani Alvarenga is a 30 y.o. female who presents for DSMT/ Omni Pod 5 follow up.  Transitioned from using Dexcom G6 to G7 with Omni Pod 5 recently..    HPI: Type of Diabetes: Type of Diabetes: Type 1 without complications    Past Medical History:  She has a past medical history of History of exploratory laparotomy, Hypothyroidism (07/03/2024), Personal history of other diseases of the circulatory system (12/21/2020), and Type 2 diabetes mellitus with hypoglycemia without coma (12/21/2020).    Allergies:  Shellfish derived and Shellfish derived    Social History:  She reports that she has never smoked. She has been exposed to tobacco smoke. She has never used smokeless tobacco. She reports current alcohol use of about 1.0 standard drink of alcohol per week. She reports that she does not use drugs.    Family History:  Family History[1]      Last Recorded Vitals:  BP Readings from Last 6 Encounters:   04/04/25 111/75   10/08/24 (!) 127/95   10/03/24 112/78   08/29/24 133/86   08/15/24 123/83   08/13/24 94/63        Wt Readings from Last 6 Encounters:   04/04/25 80.2 kg (176 lb 12.8 oz)   10/08/24 73.9 kg (163 lb)   10/03/24 73.9 kg (163 lb)   08/29/24 75.7 kg (166 lb 14.4 oz)   06/28/24 93.3 kg (205 lb 9.6 oz)   06/25/24 88.5 kg (195 lb)       Current medications: reviewed and updated with patient      Lab Review  Lab Results   Component Value Date    BILITOT 0.5 08/15/2024    CALCIUM 9.0 08/15/2024    CO2 25 08/15/2024     08/15/2024    CREATININE 0.51 08/15/2024    GLUCOSE 111 (H) 08/15/2024    ALKPHOS 98 08/15/2024    K 4.1 08/15/2024    PROT 5.8 (L) 08/15/2024     08/15/2024    AST 98 (H) 08/15/2024     (H) 08/15/2024    BUN 9 08/15/2024    ANIONGAP 13 08/15/2024    MG 1.82 02/26/2024    PHOS 4.7 10/15/2020     (H) 09/30/2020    ALBUMIN 3.5 08/15/2024    AMYLASE 43 08/13/2024    LIPASE 47 08/13/2024     Lab Results  "  Component Value Date    TRIG 51 04/08/2025    CHOL 213 (H) 04/08/2025    LDLCALC 146 (H) 04/08/2025    HDL 53 04/08/2025     Lab Results   Component Value Date    HGBA1C 8.7 (A) 04/04/2025    HGBA1C 6.4 (H) 05/28/2024    HGBA1C 6.0 (H) 05/14/2024     No components found for: \"UACR\"  The ASCVD Risk score (Kunal LYNCH, et al., 2019) failed to calculate for the following reasons:    The 2019 ASCVD risk score is only valid for ages 40 to 79    Current diet: counts carbs  Current exercise: no regular exercise    Omni Pod 5 w/ Dexcom G7  Pod and sensor not always connected, causing elevated glucose levels.  Baby knocked off pod one night without patient realizing.         Settings:      Topics Covered and Impression:    DSME Topics Covered During Visit:   Review Glycemic Goals (CGM or SMBG)  Reviewed Hypoglycemia Signs/Symptoms/Tx Plan  Reviewed Hyperglycemia Signs/Symptoms/Tx Plan  Ketone Monitoring  Reviewed connecting Omni Pod and Dexcom every time    DSME Topics for Follow-Up:   Reducing Your Risk For Other Jesse Concerns  Being Physically Active  Review Glycemic Goals (CGM or SMBG)  Reviewed Hypoglycemia Signs/Symptoms/Tx Plan  Reviewed Hyperglycemia Signs/Symptoms/Tx Plan  Glycemic Pattern Management  Sick Day Rules  Ketone Monitoring    Materials provided during today's visit: n/a    Provider Impression:   Type 1 diabetes, not at goal.  Misunderstanding about Omni Pod and Dexcom connections- reviewed instructions again today.    Follow up in 1 month after keeping Omni Pod and Dexcom G7 connected consistently.    Kathi DYER    I spent 60 minutes today with patient. This includes face to face as well as chart review and charting/ notes.     This was a virtual visit using both video and audio.   Patient was at home in [place].  Provider was in office.                     [1]   Family History  Problem Relation Name Age of Onset    Breast cancer Maternal Grandmother      Leukemia Maternal Grandmother       "

## 2025-07-15 NOTE — PATIENT INSTRUCTIONS
No changes to dosing today.  When you change your Dexcom G7, start it in the Dexcom adriel. Once connected, using your Omni Pod adriel, put the 4 digit code and serial number of the G7 into the Omni Pod adriel (you can use the QR code).    Whenever you look for your glucose level, look in the Omni Pod adriel. If you cannot see a glucose level, it means your sensor is NOT talking to your pod. You will need to connect it again.   Always keep your Omni Pod and Dexcom connected so your pods can deliver insulin based on your glucose values.

## 2025-08-08 ENCOUNTER — CLINICAL SUPPORT (OUTPATIENT)
Dept: ENDOCRINOLOGY | Facility: CLINIC | Age: 30
End: 2025-08-08
Payer: COMMERCIAL

## 2025-08-08 ENCOUNTER — TELEPHONE (OUTPATIENT)
Dept: ENDOCRINOLOGY | Facility: CLINIC | Age: 30
End: 2025-08-08

## 2025-08-08 DIAGNOSIS — E10.9 TYPE 1 DIABETES MELLITUS WITHOUT COMPLICATION: Primary | ICD-10-CM

## 2025-08-08 RX ORDER — BLOOD-GLUCOSE SENSOR
EACH MISCELLANEOUS
Qty: 9 EACH | Refills: 1 | Status: SHIPPED | OUTPATIENT
Start: 2025-08-08

## 2025-08-08 RX ORDER — BLOOD-GLUCOSE SENSOR
EACH MISCELLANEOUS
Qty: 3 EACH | Refills: 11 | Status: SHIPPED | OUTPATIENT
Start: 2025-08-08 | End: 2025-08-08 | Stop reason: SDUPTHER

## 2025-08-08 RX ORDER — BLOOD-GLUCOSE SENSOR
EACH MISCELLANEOUS
Qty: 3 EACH | Refills: 11 | Status: CANCELLED | OUTPATIENT
Start: 2025-08-08

## 2025-08-08 NOTE — PROGRESS NOTES
8/8/2025  Diabetes Education     Reason for Visit:  Estefani Alvarenga is a 30 y.o. female [facility]    HPI: Type of Diabetes: Type of Diabetes: Type 1 without complications    Past Medical History:  She has a past medical history of History of exploratory laparotomy, Hypothyroidism (07/03/2024), Personal history of other diseases of the circulatory system (12/21/2020), and Type 2 diabetes mellitus with hypoglycemia without coma (12/21/2020).    Allergies:  Shellfish derived and Shellfish derived    Social History:  She reports that she has never smoked. She has been exposed to tobacco smoke. She has never used smokeless tobacco. She reports current alcohol use of about 1.0 standard drink of alcohol per week. She reports that she does not use drugs.    Family History:  Family History[1]    Last Recorded Vitals:  BP Readings from Last 6 Encounters:   04/04/25 111/75   10/08/24 (!) 127/95   10/03/24 112/78   08/29/24 133/86   08/15/24 123/83   08/13/24 94/63        Wt Readings from Last 6 Encounters:   04/04/25 80.2 kg (176 lb 12.8 oz)   10/08/24 73.9 kg (163 lb)   10/03/24 73.9 kg (163 lb)   08/29/24 75.7 kg (166 lb 14.4 oz)   06/28/24 93.3 kg (205 lb 9.6 oz)   06/25/24 88.5 kg (195 lb)       Current medications: reviewed and updated with patient      Lab Review  Lab Results   Component Value Date    BILITOT 0.5 08/15/2024    CALCIUM 9.0 08/15/2024    CO2 25 08/15/2024     08/15/2024    CREATININE 0.51 08/15/2024    GLUCOSE 111 (H) 08/15/2024    ALKPHOS 98 08/15/2024    K 4.1 08/15/2024    PROT 5.8 (L) 08/15/2024     08/15/2024    AST 98 (H) 08/15/2024     (H) 08/15/2024    BUN 9 08/15/2024    ANIONGAP 13 08/15/2024    MG 1.82 02/26/2024    PHOS 4.7 10/15/2020     (H) 09/30/2020    ALBUMIN 3.5 08/15/2024    AMYLASE 43 08/13/2024    LIPASE 47 08/13/2024     Lab Results   Component Value Date    TRIG 51 04/08/2025    CHOL 213 (H) 04/08/2025    LDLCALC 146 (H) 04/08/2025     "HDL 53 04/08/2025     Lab Results   Component Value Date    HGBA1C 8.7 (A) 04/04/2025    HGBA1C 6.4 (H) 05/28/2024    HGBA1C 6.0 (H) 05/14/2024     No components found for: \"UACR\"  The ASCVD Risk score (Kunal LYNCH, et al., 2019) failed to calculate for the following reasons:    The 2019 ASCVD risk score is only valid for ages 40 to 79      Health Utilization since last visit:   Hospital Admissions: No.  ER Visits: No.  Primary Care Visits: No.  Last Eye Exam : patient reports annual screening by optometry/opthalmology  Podiatry : NA  Dentist : routine    Diabetes Self-Management Skills and Behaviors:   Do you exercise regularly?: No.  Physical Activity : n/a    DM medications as patient is taking them:      Currently out of Dexcom sensors. Monitoring FSBG and entering into Omni Pod.                DSME - Meal Planning   Are you currently following any meal plan: Carbohydrate Counting and not eating much.    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    DSME - Goals and Recommendations    [facility] Diabetes Education Program SMART Behavior Goal Setting:        S - Specific: Exactly what do you want to do        M - Measurable: Use a calendar or chart to track progress        A - Attainable: Take small steps to make bigger changes        R - Realistic: Pick something reasonable that you know you can do        T - Time Oriented: Choose a time limit (No longer than 6 months)    Topics Covered and Impression:    DSME Topics Covered During Visit:   Learning to [place] with Stress, Depression and Other Concerns  Taking Medications as Prescribed  Being Physically Active    Reviewed Diabetes Technology: Omni Pod/ Dexcom G7     DSME Topics for Follow-Up:   Review Glycemic Goals (CGM or SMBG)  Reviewed Hypoglycemia Signs/Symptoms/Tx Plan  Reviewed Hyperglycemia Signs/Symptoms/Tx Plan  Ways to Deal With Diabetes " Symptoms  Glycemic Pattern Management  Sick Day Rules  Ketone Monitoring    Materials provided during today's visit: n/a    Provider Impression:   Type 1 diabetes, not at goal.  Struggling with returning to work next week and leaving 1 year old Ursula.   Not eating well, not taking care of herself.  She has been checking her FSBG since being without CGM and entering it into Omni Pod.  Feeling down.  She denies thoughts of self harm or suicidal thoughts.    Plan:  No changes to dosing today.  Estefani Alvarenga agrees to contact her counselor today.   RX for G7 sensors sent to provider for approval.  Patient agrees to follow up in 1 month.    Kathi ARGUELLO    Time: I personally spent a total of 60 minutes with the patient providing diabetes self-management education. Visit documentation will be sent electronically to referring provider.          [1]   Family History  Problem Relation Name Age of Onset    Breast cancer Maternal Grandmother      Leukemia Maternal Grandmother

## 2025-08-13 ENCOUNTER — APPOINTMENT (OUTPATIENT)
Dept: ENDOCRINOLOGY | Facility: CLINIC | Age: 30
End: 2025-08-13
Payer: COMMERCIAL

## 2025-10-08 ENCOUNTER — APPOINTMENT (OUTPATIENT)
Dept: PAIN MEDICINE | Facility: CLINIC | Age: 30
End: 2025-10-08
Payer: COMMERCIAL

## 2025-10-09 ENCOUNTER — APPOINTMENT (OUTPATIENT)
Dept: ENDOCRINOLOGY | Facility: CLINIC | Age: 30
End: 2025-10-09
Payer: COMMERCIAL